# Patient Record
Sex: FEMALE | Race: OTHER | NOT HISPANIC OR LATINO | URBAN - METROPOLITAN AREA
[De-identification: names, ages, dates, MRNs, and addresses within clinical notes are randomized per-mention and may not be internally consistent; named-entity substitution may affect disease eponyms.]

---

## 2021-01-01 ENCOUNTER — INPATIENT (INPATIENT)
Facility: HOSPITAL | Age: 86
LOS: 6 days | End: 2022-01-04
Attending: INTERNAL MEDICINE | Admitting: INTERNAL MEDICINE
Payer: MEDICARE

## 2021-01-01 VITALS
HEART RATE: 116 BPM | SYSTOLIC BLOOD PRESSURE: 122 MMHG | RESPIRATION RATE: 22 BRPM | DIASTOLIC BLOOD PRESSURE: 81 MMHG | OXYGEN SATURATION: 88 % | TEMPERATURE: 98 F

## 2021-01-01 DIAGNOSIS — Z51.5 ENCOUNTER FOR PALLIATIVE CARE: ICD-10-CM

## 2021-01-01 DIAGNOSIS — Z29.9 ENCOUNTER FOR PROPHYLACTIC MEASURES, UNSPECIFIED: ICD-10-CM

## 2021-01-01 DIAGNOSIS — E87.5 HYPERKALEMIA: ICD-10-CM

## 2021-01-01 DIAGNOSIS — N17.9 ACUTE KIDNEY FAILURE, UNSPECIFIED: ICD-10-CM

## 2021-01-01 DIAGNOSIS — E87.0 HYPEROSMOLALITY AND HYPERNATREMIA: ICD-10-CM

## 2021-01-01 DIAGNOSIS — F03.90 UNSPECIFIED DEMENTIA WITHOUT BEHAVIORAL DISTURBANCE: ICD-10-CM

## 2021-01-01 DIAGNOSIS — R06.02 SHORTNESS OF BREATH: ICD-10-CM

## 2021-01-01 DIAGNOSIS — U07.1 COVID-19: ICD-10-CM

## 2021-01-01 DIAGNOSIS — I10 ESSENTIAL (PRIMARY) HYPERTENSION: ICD-10-CM

## 2021-01-01 DIAGNOSIS — R53.81 OTHER MALAISE: ICD-10-CM

## 2021-01-01 DIAGNOSIS — R63.0 ANOREXIA: ICD-10-CM

## 2021-01-01 DIAGNOSIS — R77.8 OTHER SPECIFIED ABNORMALITIES OF PLASMA PROTEINS: ICD-10-CM

## 2021-01-01 LAB
ALBUMIN SERPL ELPH-MCNC: 1.9 G/DL — LOW (ref 3.3–5)
ALBUMIN SERPL ELPH-MCNC: 2.1 G/DL — LOW (ref 3.3–5)
ALBUMIN SERPL ELPH-MCNC: 2.1 G/DL — LOW (ref 3.3–5)
ALBUMIN SERPL ELPH-MCNC: 2.3 G/DL — LOW (ref 3.3–5)
ALP SERPL-CCNC: 51 U/L — SIGNIFICANT CHANGE UP (ref 40–120)
ALP SERPL-CCNC: 51 U/L — SIGNIFICANT CHANGE UP (ref 40–120)
ALP SERPL-CCNC: 52 U/L — SIGNIFICANT CHANGE UP (ref 40–120)
ALP SERPL-CCNC: 56 U/L — SIGNIFICANT CHANGE UP (ref 40–120)
ALT FLD-CCNC: 17 U/L — SIGNIFICANT CHANGE UP (ref 12–78)
ALT FLD-CCNC: 18 U/L — SIGNIFICANT CHANGE UP (ref 12–78)
ALT FLD-CCNC: 18 U/L — SIGNIFICANT CHANGE UP (ref 12–78)
ALT FLD-CCNC: 20 U/L — SIGNIFICANT CHANGE UP (ref 12–78)
ANION GAP SERPL CALC-SCNC: 11 MMOL/L — SIGNIFICANT CHANGE UP (ref 5–17)
ANION GAP SERPL CALC-SCNC: 11 MMOL/L — SIGNIFICANT CHANGE UP (ref 5–17)
ANION GAP SERPL CALC-SCNC: 12 MMOL/L — SIGNIFICANT CHANGE UP (ref 5–17)
ANION GAP SERPL CALC-SCNC: 8 MMOL/L — SIGNIFICANT CHANGE UP (ref 5–17)
AST SERPL-CCNC: 45 U/L — HIGH (ref 15–37)
AST SERPL-CCNC: 50 U/L — HIGH (ref 15–37)
AST SERPL-CCNC: 54 U/L — HIGH (ref 15–37)
AST SERPL-CCNC: 73 U/L — HIGH (ref 15–37)
BASE EXCESS BLDV CALC-SCNC: -1.7 MMOL/L — SIGNIFICANT CHANGE UP (ref -2–3)
BASOPHILS # BLD AUTO: 0.03 K/UL — SIGNIFICANT CHANGE UP (ref 0–0.2)
BASOPHILS NFR BLD AUTO: 0.4 % — SIGNIFICANT CHANGE UP (ref 0–2)
BILIRUB DIRECT SERPL-MCNC: 0.3 MG/DL — SIGNIFICANT CHANGE UP (ref 0–0.3)
BILIRUB INDIRECT FLD-MCNC: 0.3 MG/DL — SIGNIFICANT CHANGE UP (ref 0.2–1)
BILIRUB SERPL-MCNC: 0.6 MG/DL — SIGNIFICANT CHANGE UP (ref 0.2–1.2)
BILIRUB SERPL-MCNC: 0.8 MG/DL — SIGNIFICANT CHANGE UP (ref 0.2–1.2)
BLOOD GAS COMMENTS, VENOUS: SIGNIFICANT CHANGE UP
BUN SERPL-MCNC: 100 MG/DL — HIGH (ref 7–23)
BUN SERPL-MCNC: 117 MG/DL — HIGH (ref 7–23)
BUN SERPL-MCNC: 120 MG/DL — HIGH (ref 7–23)
BUN SERPL-MCNC: 87 MG/DL — HIGH (ref 7–23)
CALCIUM SERPL-MCNC: 8.5 MG/DL — SIGNIFICANT CHANGE UP (ref 8.5–10.1)
CALCIUM SERPL-MCNC: 8.5 MG/DL — SIGNIFICANT CHANGE UP (ref 8.5–10.1)
CALCIUM SERPL-MCNC: 8.7 MG/DL — SIGNIFICANT CHANGE UP (ref 8.5–10.1)
CALCIUM SERPL-MCNC: 8.9 MG/DL — SIGNIFICANT CHANGE UP (ref 8.5–10.1)
CHLORIDE BLDV-SCNC: 111 MMOL/L — HIGH (ref 98–107)
CHLORIDE SERPL-SCNC: 108 MMOL/L — SIGNIFICANT CHANGE UP (ref 96–108)
CHLORIDE SERPL-SCNC: 113 MMOL/L — HIGH (ref 96–108)
CHLORIDE SERPL-SCNC: 114 MMOL/L — HIGH (ref 96–108)
CHLORIDE SERPL-SCNC: 114 MMOL/L — HIGH (ref 96–108)
CO2 BLDV-SCNC: 25 MMOL/L — SIGNIFICANT CHANGE UP (ref 22–26)
CO2 SERPL-SCNC: 23 MMOL/L — SIGNIFICANT CHANGE UP (ref 22–31)
CO2 SERPL-SCNC: 24 MMOL/L — SIGNIFICANT CHANGE UP (ref 22–31)
CO2 SERPL-SCNC: 25 MMOL/L — SIGNIFICANT CHANGE UP (ref 22–31)
CO2 SERPL-SCNC: 26 MMOL/L — SIGNIFICANT CHANGE UP (ref 22–31)
CREAT SERPL-MCNC: 4.15 MG/DL — HIGH (ref 0.5–1.3)
CREAT SERPL-MCNC: 4.27 MG/DL — HIGH (ref 0.5–1.3)
CREAT SERPL-MCNC: 4.56 MG/DL — HIGH (ref 0.5–1.3)
CREAT SERPL-MCNC: 4.57 MG/DL — HIGH (ref 0.5–1.3)
CREAT SERPL-MCNC: 4.66 MG/DL — HIGH (ref 0.5–1.3)
CREAT SERPL-MCNC: 4.81 MG/DL — HIGH (ref 0.5–1.3)
CREAT SERPL-MCNC: 5.02 MG/DL — HIGH (ref 0.5–1.3)
CRP SERPL-MCNC: 44 MG/L — HIGH
CRP SERPL-MCNC: 60 MG/L — HIGH
D DIMER BLD IA.RAPID-MCNC: 365 NG/ML DDU — HIGH
D DIMER BLD IA.RAPID-MCNC: 384 NG/ML DDU — HIGH
EOSINOPHIL # BLD AUTO: 0.02 K/UL — SIGNIFICANT CHANGE UP (ref 0–0.5)
EOSINOPHIL NFR BLD AUTO: 0.3 % — SIGNIFICANT CHANGE UP (ref 0–6)
FERRITIN SERPL-MCNC: 1286 NG/ML — HIGH (ref 15–150)
FERRITIN SERPL-MCNC: 1527 NG/ML — HIGH (ref 15–150)
FLUAV AG NPH QL: SIGNIFICANT CHANGE UP
FLUBV AG NPH QL: SIGNIFICANT CHANGE UP
GAS PNL BLDV: 144 MMOL/L — SIGNIFICANT CHANGE UP (ref 136–145)
GAS PNL BLDV: SIGNIFICANT CHANGE UP
GAS PNL BLDV: SIGNIFICANT CHANGE UP
GLUCOSE BLDV-MCNC: 122 MG/DL — HIGH (ref 65–95)
GLUCOSE SERPL-MCNC: 123 MG/DL — HIGH (ref 70–99)
GLUCOSE SERPL-MCNC: 129 MG/DL — HIGH (ref 70–99)
GLUCOSE SERPL-MCNC: 132 MG/DL — HIGH (ref 70–99)
GLUCOSE SERPL-MCNC: 174 MG/DL — HIGH (ref 70–99)
HCO3 BLDV-SCNC: 24 MMOL/L — SIGNIFICANT CHANGE UP (ref 22–28)
HCT VFR BLD CALC: 35.7 % — SIGNIFICANT CHANGE UP (ref 34.5–45)
HCT VFR BLD CALC: 41.5 % — SIGNIFICANT CHANGE UP (ref 34.5–45)
HCT VFR BLDA CALC: 35 % — LOW (ref 37–47)
HGB BLD CALC-MCNC: 11.8 G/DL — SIGNIFICANT CHANGE UP (ref 11.7–16.1)
HGB BLD-MCNC: 11.1 G/DL — LOW (ref 11.5–15.5)
HGB BLD-MCNC: 13 G/DL — SIGNIFICANT CHANGE UP (ref 11.5–15.5)
HOROWITZ INDEX BLDV+IHG-RTO: 100 — SIGNIFICANT CHANGE UP
IMM GRANULOCYTES NFR BLD AUTO: 5.9 % — HIGH (ref 0–1.5)
LACTATE BLDV-MCNC: 1.8 MMOL/L — HIGH (ref 0.56–1.39)
LACTATE SERPL-SCNC: 1.5 MMOL/L — SIGNIFICANT CHANGE UP (ref 0.7–2)
LDH SERPL L TO P-CCNC: 474 U/L — HIGH (ref 50–242)
LYMPHOCYTES # BLD AUTO: 1.81 K/UL — SIGNIFICANT CHANGE UP (ref 1–3.3)
LYMPHOCYTES # BLD AUTO: 23.4 % — SIGNIFICANT CHANGE UP (ref 13–44)
MCHC RBC-ENTMCNC: 30.9 PG — SIGNIFICANT CHANGE UP (ref 27–34)
MCHC RBC-ENTMCNC: 30.9 PG — SIGNIFICANT CHANGE UP (ref 27–34)
MCHC RBC-ENTMCNC: 31.1 GM/DL — LOW (ref 32–36)
MCHC RBC-ENTMCNC: 31.3 GM/DL — LOW (ref 32–36)
MCV RBC AUTO: 98.6 FL — SIGNIFICANT CHANGE UP (ref 80–100)
MCV RBC AUTO: 99.4 FL — SIGNIFICANT CHANGE UP (ref 80–100)
MONOCYTES # BLD AUTO: 0.46 K/UL — SIGNIFICANT CHANGE UP (ref 0–0.9)
MONOCYTES NFR BLD AUTO: 5.9 % — SIGNIFICANT CHANGE UP (ref 2–14)
NEUTROPHILS # BLD AUTO: 4.96 K/UL — SIGNIFICANT CHANGE UP (ref 1.8–7.4)
NEUTROPHILS NFR BLD AUTO: 64.1 % — SIGNIFICANT CHANGE UP (ref 43–77)
NRBC # BLD: 0 /100 WBCS — SIGNIFICANT CHANGE UP (ref 0–0)
NRBC # BLD: 0 /100 WBCS — SIGNIFICANT CHANGE UP (ref 0–0)
NT-PROBNP SERPL-SCNC: 2485 PG/ML — HIGH (ref 0–450)
PCO2 BLDV: 42 MMHG — SIGNIFICANT CHANGE UP (ref 42–55)
PH BLDV: 7.36 — SIGNIFICANT CHANGE UP (ref 7.32–7.43)
PLATELET # BLD AUTO: 128 K/UL — LOW (ref 150–400)
PLATELET # BLD AUTO: 91 K/UL — LOW (ref 150–400)
PO2 BLDV: 33 MMHG — SIGNIFICANT CHANGE UP (ref 25–45)
POTASSIUM BLDV-SCNC: 4.3 MMOL/L — SIGNIFICANT CHANGE UP (ref 3.5–5.1)
POTASSIUM SERPL-MCNC: 4.3 MMOL/L — SIGNIFICANT CHANGE UP (ref 3.5–5.3)
POTASSIUM SERPL-MCNC: 4.9 MMOL/L — SIGNIFICANT CHANGE UP (ref 3.5–5.3)
POTASSIUM SERPL-MCNC: 5.4 MMOL/L — HIGH (ref 3.5–5.3)
POTASSIUM SERPL-MCNC: 5.4 MMOL/L — HIGH (ref 3.5–5.3)
POTASSIUM SERPL-SCNC: 4.3 MMOL/L — SIGNIFICANT CHANGE UP (ref 3.5–5.3)
POTASSIUM SERPL-SCNC: 4.9 MMOL/L — SIGNIFICANT CHANGE UP (ref 3.5–5.3)
POTASSIUM SERPL-SCNC: 5.4 MMOL/L — HIGH (ref 3.5–5.3)
POTASSIUM SERPL-SCNC: 5.4 MMOL/L — HIGH (ref 3.5–5.3)
PROCALCITONIN SERPL-MCNC: 0.56 NG/ML — HIGH (ref 0.02–0.1)
PROT SERPL-MCNC: 6.1 GM/DL — SIGNIFICANT CHANGE UP (ref 6–8.3)
PROT SERPL-MCNC: 6.5 GM/DL — SIGNIFICANT CHANGE UP (ref 6–8.3)
PROT SERPL-MCNC: 6.9 GM/DL — SIGNIFICANT CHANGE UP (ref 6–8.3)
PROT SERPL-MCNC: 7.4 GM/DL — SIGNIFICANT CHANGE UP (ref 6–8.3)
RBC # BLD: 3.59 M/UL — LOW (ref 3.8–5.2)
RBC # BLD: 4.21 M/UL — SIGNIFICANT CHANGE UP (ref 3.8–5.2)
RBC # FLD: 13.1 % — SIGNIFICANT CHANGE UP (ref 10.3–14.5)
RBC # FLD: 13.5 % — SIGNIFICANT CHANGE UP (ref 10.3–14.5)
SAO2 % BLDV: 54.2 % — LOW (ref 94–98)
SARS-COV-2 RNA SPEC QL NAA+PROBE: DETECTED
SODIUM SERPL-SCNC: 145 MMOL/L — SIGNIFICANT CHANGE UP (ref 135–145)
SODIUM SERPL-SCNC: 147 MMOL/L — HIGH (ref 135–145)
SODIUM SERPL-SCNC: 148 MMOL/L — HIGH (ref 135–145)
SODIUM SERPL-SCNC: 149 MMOL/L — HIGH (ref 135–145)
TROPONIN I, HIGH SENSITIVITY RESULT: 101.2 NG/L — HIGH
TROPONIN I, HIGH SENSITIVITY RESULT: 102 NG/L — HIGH
TROPONIN I, HIGH SENSITIVITY RESULT: 42.8 NG/L — SIGNIFICANT CHANGE UP
TROPONIN I, HIGH SENSITIVITY RESULT: 56.7 NG/L — HIGH
TROPONIN I, HIGH SENSITIVITY RESULT: 65 NG/L — HIGH
WBC # BLD: 7.39 K/UL — SIGNIFICANT CHANGE UP (ref 3.8–10.5)
WBC # BLD: 7.74 K/UL — SIGNIFICANT CHANGE UP (ref 3.8–10.5)
WBC # FLD AUTO: 7.39 K/UL — SIGNIFICANT CHANGE UP (ref 3.8–10.5)
WBC # FLD AUTO: 7.74 K/UL — SIGNIFICANT CHANGE UP (ref 3.8–10.5)

## 2021-01-01 PROCEDURE — 93010 ELECTROCARDIOGRAM REPORT: CPT

## 2021-01-01 PROCEDURE — 99232 SBSQ HOSP IP/OBS MODERATE 35: CPT

## 2021-01-01 PROCEDURE — 93970 EXTREMITY STUDY: CPT | Mod: 26

## 2021-01-01 PROCEDURE — 99223 1ST HOSP IP/OBS HIGH 75: CPT

## 2021-01-01 PROCEDURE — 71045 X-RAY EXAM CHEST 1 VIEW: CPT | Mod: 26

## 2021-01-01 PROCEDURE — 99291 CRITICAL CARE FIRST HOUR: CPT | Mod: CS

## 2021-01-01 PROCEDURE — 99233 SBSQ HOSP IP/OBS HIGH 50: CPT

## 2021-01-01 PROCEDURE — 99222 1ST HOSP IP/OBS MODERATE 55: CPT

## 2021-01-01 RX ORDER — REMDESIVIR 5 MG/ML
100 INJECTION INTRAVENOUS EVERY 24 HOURS
Refills: 0 | Status: COMPLETED | OUTPATIENT
Start: 2021-01-01 | End: 2022-01-01

## 2021-01-01 RX ORDER — ALBUTEROL 90 UG/1
2 AEROSOL, METERED ORAL EVERY 6 HOURS
Refills: 0 | Status: DISCONTINUED | OUTPATIENT
Start: 2021-01-01 | End: 2022-01-01

## 2021-01-01 RX ORDER — SODIUM CHLORIDE 9 MG/ML
1000 INJECTION, SOLUTION INTRAVENOUS
Refills: 0 | Status: DISCONTINUED | OUTPATIENT
Start: 2021-01-01 | End: 2022-01-01

## 2021-01-01 RX ORDER — HYDROMORPHONE HYDROCHLORIDE 2 MG/ML
0.25 INJECTION INTRAMUSCULAR; INTRAVENOUS; SUBCUTANEOUS EVERY 6 HOURS
Refills: 0 | Status: DISCONTINUED | OUTPATIENT
Start: 2021-01-01 | End: 2022-01-01

## 2021-01-01 RX ORDER — REMDESIVIR 5 MG/ML
INJECTION INTRAVENOUS
Refills: 0 | Status: COMPLETED | OUTPATIENT
Start: 2021-01-01 | End: 2022-01-01

## 2021-01-01 RX ORDER — SODIUM POLYSTYRENE SULFONATE 4.1 MEQ/G
15 POWDER, FOR SUSPENSION ORAL ONCE
Refills: 0 | Status: COMPLETED | OUTPATIENT
Start: 2021-01-01 | End: 2021-01-01

## 2021-01-01 RX ORDER — DEXAMETHASONE 0.5 MG/5ML
6 ELIXIR ORAL DAILY
Refills: 0 | Status: DISCONTINUED | OUTPATIENT
Start: 2021-01-01 | End: 2022-01-01

## 2021-01-01 RX ORDER — LISINOPRIL 2.5 MG/1
20 TABLET ORAL DAILY
Refills: 0 | Status: DISCONTINUED | OUTPATIENT
Start: 2021-01-01 | End: 2021-01-01

## 2021-01-01 RX ORDER — ALBUTEROL 90 UG/1
2 AEROSOL, METERED ORAL ONCE
Refills: 0 | Status: COMPLETED | OUTPATIENT
Start: 2021-01-01 | End: 2021-01-01

## 2021-01-01 RX ORDER — REMDESIVIR 5 MG/ML
100 INJECTION INTRAVENOUS EVERY 24 HOURS
Refills: 0 | Status: COMPLETED | OUTPATIENT
Start: 2021-01-01 | End: 2021-01-01

## 2021-01-01 RX ORDER — HEPARIN SODIUM 5000 [USP'U]/ML
5000 INJECTION INTRAVENOUS; SUBCUTANEOUS EVERY 12 HOURS
Refills: 0 | Status: DISCONTINUED | OUTPATIENT
Start: 2021-01-01 | End: 2022-01-01

## 2021-01-01 RX ORDER — DEXAMETHASONE 0.5 MG/5ML
6 ELIXIR ORAL ONCE
Refills: 0 | Status: COMPLETED | OUTPATIENT
Start: 2021-01-01 | End: 2021-01-01

## 2021-01-01 RX ORDER — ACETAMINOPHEN 500 MG
650 TABLET ORAL EVERY 6 HOURS
Refills: 0 | Status: DISCONTINUED | OUTPATIENT
Start: 2021-01-01 | End: 2022-01-01

## 2021-01-01 RX ADMIN — HEPARIN SODIUM 5000 UNIT(S): 5000 INJECTION INTRAVENOUS; SUBCUTANEOUS at 17:40

## 2021-01-01 RX ADMIN — HYDROMORPHONE HYDROCHLORIDE 0.25 MILLIGRAM(S): 2 INJECTION INTRAMUSCULAR; INTRAVENOUS; SUBCUTANEOUS at 23:59

## 2021-01-01 RX ADMIN — SODIUM CHLORIDE 100 MILLILITER(S): 9 INJECTION, SOLUTION INTRAVENOUS at 17:17

## 2021-01-01 RX ADMIN — HEPARIN SODIUM 5000 UNIT(S): 5000 INJECTION INTRAVENOUS; SUBCUTANEOUS at 06:14

## 2021-01-01 RX ADMIN — Medication 6 MILLIGRAM(S): at 19:49

## 2021-01-01 RX ADMIN — Medication 6 MILLIGRAM(S): at 06:14

## 2021-01-01 RX ADMIN — SODIUM CHLORIDE 80 MILLILITER(S): 9 INJECTION, SOLUTION INTRAVENOUS at 14:41

## 2021-01-01 RX ADMIN — ALBUTEROL 2 PUFF(S): 90 AEROSOL, METERED ORAL at 18:23

## 2021-01-01 RX ADMIN — REMDESIVIR 500 MILLIGRAM(S): 5 INJECTION INTRAVENOUS at 17:20

## 2021-01-01 RX ADMIN — HEPARIN SODIUM 5000 UNIT(S): 5000 INJECTION INTRAVENOUS; SUBCUTANEOUS at 17:20

## 2021-01-01 RX ADMIN — Medication 6 MILLIGRAM(S): at 14:39

## 2021-01-01 RX ADMIN — SODIUM CHLORIDE 100 MILLILITER(S): 9 INJECTION, SOLUTION INTRAVENOUS at 12:17

## 2021-01-01 RX ADMIN — SODIUM CHLORIDE 80 MILLILITER(S): 9 INJECTION, SOLUTION INTRAVENOUS at 05:19

## 2021-01-01 RX ADMIN — HYDROMORPHONE HYDROCHLORIDE 0.25 MILLIGRAM(S): 2 INJECTION INTRAMUSCULAR; INTRAVENOUS; SUBCUTANEOUS at 17:18

## 2021-01-01 RX ADMIN — SODIUM POLYSTYRENE SULFONATE 15 GRAM(S): 4.1 POWDER, FOR SUSPENSION ORAL at 14:41

## 2021-01-01 RX ADMIN — HEPARIN SODIUM 5000 UNIT(S): 5000 INJECTION INTRAVENOUS; SUBCUTANEOUS at 17:18

## 2021-01-01 RX ADMIN — Medication 6 MILLIGRAM(S): at 05:18

## 2021-01-01 RX ADMIN — HEPARIN SODIUM 5000 UNIT(S): 5000 INJECTION INTRAVENOUS; SUBCUTANEOUS at 05:19

## 2021-01-01 RX ADMIN — REMDESIVIR 500 MILLIGRAM(S): 5 INJECTION INTRAVENOUS at 17:40

## 2021-01-01 RX ADMIN — REMDESIVIR 500 MILLIGRAM(S): 5 INJECTION INTRAVENOUS at 17:18

## 2021-01-01 RX ADMIN — LISINOPRIL 20 MILLIGRAM(S): 2.5 TABLET ORAL at 06:15

## 2021-12-28 NOTE — ED PROVIDER NOTE - CLINICAL SUMMARY MEDICAL DECISION MAKING FREE TEXT BOX
89 yo female with pmhx htn presenting with weakness and cough. concern for covid hypoxia, in resp distress, will obtain covid labs, cxr, will give steroids, will start on hfnc for resp support. d/w patient, currently FULL CODE.

## 2021-12-28 NOTE — ED PROVIDER NOTE - OBJECTIVE STATEMENT
89 yo female with pmhx htn presenting with weakness and cough. Patient was having weakness/lethargy, where [facility] called EMS. O2 sat as 50s%, 83% on NRB, brought to ED. NRB 15L O2 88-90%. Patient denies any other sx.    Denies CP, LOC, N/V/D, CP.

## 2021-12-28 NOTE — H&P ADULT - ASSESSMENT
Patient is a 89F with a PMH of HTN who presents to the ED for dyspnea.  Patient currently AAOx2, able to provide limited history.  Patient admitted  for COVID19.  Has had symptoms for 7 days.  Symptoms include fever, chills, cough, generalized weakness and increased dyspnea.  Cough has been nonproductive.  Patient states that she has not received a COVID vaccine.  Nonsmoker, NKDA.  Current SpO2 96% on 50L O2 via high flow NC.  Labs show elevated creatinine and elevated troponin.  Will admit to tele.

## 2021-12-28 NOTE — ED PROVIDER NOTE - CRITICAL CARE ATTENDING CONTRIBUTION TO CARE
Patient required frequent monitoring, interpreting results, starting HFNC, consulting nephro, requiring critical care time.

## 2021-12-28 NOTE — H&P ADULT - PROBLEM SELECTOR PLAN 2
Troponin elevated.  Will trend  No current chest pain.    Likely related to covid infection, resp failure

## 2021-12-28 NOTE — H&P ADULT - PROBLEM SELECTOR PLAN 1
COVID 19 swabbed in ED - +ve results.  Isolation precautions  Tylenol PRN fever  Will start dexamethasone as patient requiring supplemental oxygen  Holding remdesivier due to significant renal failure  hold lovenox as patient is thrombocytopenic  Prognosis poor - discuss GOC with family as patient currently AAOx2

## 2021-12-28 NOTE — H&P ADULT - NSHPLABSRESULTS_GEN_ALL_CORE
Recent Vitals  T(C): 36.6 (12-28-21 @ 17:39), Max: 36.6 (12-28-21 @ 17:39)  HR: 102 (12-28-21 @ 21:04) (102 - 116)  BP: 122/81 (12-28-21 @ 17:39) (122/81 - 122/81)  RR: 19 (12-28-21 @ 21:04) (19 - 22)  SpO2: 93% (12-28-21 @ 21:04) (88% - 93%)                        13.0   7.74  )-----------( 128      ( 28 Dec 2021 18:26 )             41.5     12-28    145  |  108  |  87<H>  ----------------------------<  123<H>  4.9   |  25  |  5.02<H>    Ca    8.5      28 Dec 2021 18:26    TPro  7.4  /  Alb  2.3<L>  /  TBili  0.8  /  DBili  x   /  AST  73<H>  /  ALT  20  /  AlkPhos  56  12-28      LIVER FUNCTIONS - ( 28 Dec 2021 18:26 )  Alb: 2.3 g/dL / Pro: 7.4 gm/dL / ALK PHOS: 56 U/L / ALT: 20 U/L / AST: 73 U/L / GGT: x               Home Medications:

## 2021-12-28 NOTE — ED PROVIDER NOTE - PROGRESS NOTE DETAILS
APPLE Peña MD  reassessed, improved sx with hfnc. KERI Ct 5, patient denies any kidney problems in past, d/w dr. esposito, aware. TBA. pt signed out to me from dr Peña, pt presented hypoxic, Covid +, to be admitted, currently on high flow, feeling more comfortable

## 2021-12-28 NOTE — H&P ADULT - NSHPPHYSICALEXAM_GEN_ALL_CORE
Physical exam:  General: patient in no acute distress, resting comfortably  Head:  Atraumatic, Normocephalic  Eyes: EOMI, PERRLA, clear sclera  Neck: Supple, thyroid nontender, non enlarged  Cardio: deferred   Resp: deferred   GI: abdomen soft, nontender, non distended, no guarding, BS +ve x 4  Ext: no significant pedal edema  Neuro: CN 2-12 intact, no significant motor or sensory deficits.  Skin: No rashes or lesions

## 2021-12-28 NOTE — ED ADULT TRIAGE NOTE - CHIEF COMPLAINT QUOTE
pt biba from home , a&O x3, was c.o of weakness , cough unable to ambulate pulse ox at home 50% 66% on room air. 83 non breather at 10L

## 2021-12-29 NOTE — CONSULT NOTE ADULT - SUBJECTIVE AND OBJECTIVE BOX
GERMÁN ALBERTO  MRN-[id number]        Patient is a 89y old  Female who presents with a chief complaint of covid (29 Dec 2021 13:53)      HPI:  Patient is a 89F with a PMH of HTN who presents to the ED for dyspnea.  Patient currently AAOx2, able to provide limited history.  Patient admitted  for COVID19.  Has had symptoms for 7 days.  Symptoms include fever, chills, cough, generalized weakness and increased dyspnea.  Cough has been nonproductive.  Patient states that she has not received a COVID vaccine.  Nonsmoker, NKDA.  Current SpO2 96% on 50L O2 via high flow NC.  Labs show elevated creatinine and elevated troponin.  Will admit to tele. (28 Dec 2021 21:44)      ID consulted for workup and antibiotic management     PAST MEDICAL & SURGICAL HISTORY:  HTN (hypertension)        Allergies  No Known Allergies        ANTIMICROBIALS:  remdesivir  IVPB    remdesivir  IVPB 100 every 24 hours      MEDICATIONS  (STANDING):    remdesivir  IVPB   500 mL/Hr IV Intermittent (12-29-21 @ 17:20)        OTHER MEDS: MEDICATIONS  (STANDING):  acetaminophen     Tablet .. 650 every 6 hours PRN  dexAMETHasone  Injectable 6 daily  heparin   Injectable 5000 every 12 hours  lisinopril 20 daily      SOCIAL HISTORY:     former smoker    FAMILY HISTORY:  FH: HTN (hypertension)        REVIEW OF SYSTEMS  [  ] ROS unobtainable because:    [ X ] All other systems negative except as noted below:	    Constitutional:  [ ] fever [ ] chills  [ ] weight loss  [X ] weakness  Skin:  [ ] rash [ ] phlebitis	  Eyes: [ ] icterus [ ] pain  [ ] discharge	  ENMT: [ ] sore throat  [ ] thrush [ ] ulcers [ ] exudates  Respiratory: [ X] dyspnea [ ] hemoptysis [X ] cough [ ] sputum	  Cardiovascular:  [ ] chest pain [ ] palpitations [ ] edema	  Gastrointestinal:  [ ] nausea [ ] vomiting [ ] diarrhea [ ] constipation [ ] pain	  Genitourinary:  [ ] dysuria [ ] frequency [ ] hematuria [ ] discharge [ ] flank pain  [ ] incontinence  Musculoskeletal:  [ ] myalgias [ ] arthralgias [ ] arthritis  [ ] back pain  Neurological:  [ ] headache [ ] seizures  [ ] confusion/altered mental status  Psychiatric:  [ ] anxiety [ ] depression	  Hematology/Lymphatics:  [ ] lymphadenopathy  Endocrine:  [ ] adrenal [ ] thyroid  Allergic/Immunologic:	 [ ] transplant [ ] seasonal    Vital Signs Last 24 Hrs  T(F): 97.6 (12-29-21 @ 23:46), Max: 97.9 (12-28-21 @ 17:39)    Vital Signs Last 24 Hrs  HR: 94 (12-30-21 @ 00:39) (87 - 103)  BP: 128/81 (12-29-21 @ 23:46) (104/60 - 130/90)  RR: 18 (12-30-21 @ 00:39)  SpO2: 95% (12-30-21 @ 00:39) (92% - 100%)  Wt(kg): --    PHYSICAL EXAM:  Constitutional: non-toxic, no distress, on HFNC  HEAD/EYES: anicteric, no conjunctival injection  ENT:  supple, no thrush  Cardiovascular:   normal S1, S2, no murmur, no edema  Respiratory:  crackles bilaterally, no wheezes,  GI:  soft, non-tender, normal bowel sounds  :  no garland, no CVA tenderness  Musculoskeletal:  no synovitis, normal ROM  Neurologic: awake and alert, no focal findings  Skin:  no rash, no erythema, no phlebitis  Heme/Onc: no lymphadenopathy   Psychiatric:  awake, alert, appropriate mood          WBC Count: 7.74 K/uL (12-28 @ 18:26)      Auto Neutrophil %: 64.1 % (12-28-21 @ 18:26)  Auto Neutrophil #: 4.96 K/uL (12-28-21 @ 18:26)                            13.0   7.74  )-----------( 128      ( 28 Dec 2021 18:26 )             41.5       12-29    x   |  x   |  x   ----------------------------<  x   x    |  x   |  4.57<H>    Ca    8.7      29 Dec 2021 08:21    TPro  6.5  /  Alb  2.1<L>  /  TBili  0.6  /  DBili  0.3  /  AST  54<H>  /  ALT  18  /  AlkPhos  51  12-29      Creatinine Trend: 4.57<--, 4.81<--, 5.02<--        MICROBIOLOGY:  Flu With COVID-19 By JEMAL (12.28.21 @ 18:26)    SARS-CoV-2 Result: Detected: EUA/IVD  This Respiratory Panel uses polymerase chain reaction (PCR) to detect for  influenza A; influenza B; and SARS-CoV-2.  This test was validated by [facility] and is in use under the FDA  Emergency Use Authorization (EUA) for clinical labs CLIA-certified to  perform high complexity testing. Test results should be correlated with  clinical presentation, patient history, and epidemiology.    Influenza A Result: NotDetec: EUA/IVD    Influenza B Result: NotDetec: EUA/IVD        C-Reactive Protein, Serum: 60 (12-29)    Ferritin, Serum: 1527 (12-29)      D-Dimer Assay, Quantitative: 365 (12-28)    Procalcitonin, Serum: 0.56 (12-29-21 @ 00:54)      RADIOLOGY:  < from: US Duplex Venous Lower Ext Complete, Bilateral (12.29.21 @ 17:58) >    IMPRESSION:  No evidence of deep venous thrombosis in either lower extremity.    < from: Xray Chest 1 View-PORTABLE IMMEDIATE (12.28.21 @ 19:05) >    IMPRESSION: Advanced bilateral infiltrates. Covid pneumonia not excluded.    (I personally reviewed)

## 2021-12-29 NOTE — PROGRESS NOTE ADULT - SUBJECTIVE AND OBJECTIVE BOX
Patient is a 89y old  Female who presents with a chief complaint of covid (29 Dec 2021 12:07)      OVERNIGHT EVENTS:    REVIEW OF SYSTEMS: denies chest pain/SOB, diaphoresis, no F/C, cough, dizziness, headache, blurry vision, nausea, vomiting, abdominal pain. All others review of systems negative     MEDICATIONS  (STANDING):  dexAMETHasone  Injectable 6 milliGRAM(s) IV Push daily  lisinopril 20 milliGRAM(s) Oral daily    MEDICATIONS  (PRN):  acetaminophen     Tablet .. 650 milliGRAM(s) Oral every 6 hours PRN Temp greater or equal to 38C (100.4F), Moderate Pain (4 - 6)      Allergies    No Known Allergies    Intolerances        T(F): 97.3 (12-29-21 @ 10:33), Max: 97.9 (12-28-21 @ 17:39)  HR: 102 (12-29-21 @ 10:33) (87 - 116)  BP: 104/60 (12-29-21 @ 10:33) (104/60 - 130/90)  RR: 20 (12-29-21 @ 13:44) (19 - 24)  SpO2: 94% (12-29-21 @ 13:44) (88% - 100%)  Wt(kg): --    PHYSICAL EXAM:  GENERAL: NAD  HEAD:  Atraumatic, Normocephalic  EYES: PERRLA, conjunctiva and sclera clear  ENMT: Moist mucous membranes  NECK: Supple, No JVD, Normal thyroid  NERVOUS SYSTEM:  Alert & Awake  CHEST/LUNG: Clear to percussion bilaterally;   HEART: Regular rate and rhythm;   ABDOMEN: Soft, Nontender, Nondistended; Bowel sounds present  EXTREMITIES:  no edema BL LE  SKIN: moist    LABS:                        13.0   7.74  )-----------( 128      ( 28 Dec 2021 18:26 )             41.5     12-28    145  |  108  |  87<H>  ----------------------------<  123<H>  4.9   |  25  |  5.02<H>    Ca    8.5      28 Dec 2021 18:26    TPro  7.4  /  Alb  2.3<L>  /  TBili  0.8  /  DBili  x   /  AST  73<H>  /  ALT  20  /  AlkPhos  56  12-28        Cultures;   CAPILLARY BLOOD GLUCOSE        Lipid panel:           RADIOLOGY & ADDITIONAL TESTS:    Imaging Personally Reviewed:  [x ] YES      Consultant(s) Notes Reviewed:  [x ] YES     Care Discussed with [x ] Consultants [X ] Patient [ ] Family  [x ]    [x ]  Other; RN

## 2021-12-29 NOTE — PATIENT PROFILE ADULT - FALL HARM RISK - HARM RISK INTERVENTIONS

## 2021-12-29 NOTE — CONSULT NOTE ADULT - SUBJECTIVE AND OBJECTIVE BOX
Patient chart reviewed, full consult to follow.     Thank you for the courtesy of this consultation.   [facility] NEPHROLOGY [facility], [facility]  NEPHROLOGY AND HYPERTENSION  [address]  [address]  [place] [address]  [phone]    MD JOANIE ESPINAL MD ANDREY GONCHARUK, MD MADHU KORRAPATI, MD YELENA ROSENBERG, MD BINNY KOSHY, MD CHRISTOPHER CAPUTO, MD EDWARD BOVER, MD      Information from chart:  "Patient is a 89y old  Female who presents with a chief complaint of covid (29 Dec 2021 13:53)    HPI:  Patient is a 89F with a PMH of HTN who presents to the ED for dyspnea.  Patient currently AAOx2, able to provide limited history.  Patient admitted  for COVID19.  Has had symptoms for 7 days.  Symptoms include fever, chills, cough, generalized weakness and increased dyspnea.  Cough has been nonproductive.  Patient states that she has not received a COVID vaccine.  Nonsmoker, NKDA.  Current SpO2 96% on 50L O2 via high flow NC.  Labs show elevated creatinine and elevated troponin.  Will admit to tele. (28 Dec 2021 21:44)   "      Patient appears comfrotable no distress    PAST MEDICAL & SURGICAL HISTORY:  HTN (hypertension)      FAMILY HISTORY:  FH: HTN (hypertension)      Allergies    No Known Allergies    Intolerances      Home Medications:    MEDICATIONS  (STANDING):  dexAMETHasone  Injectable 6 milliGRAM(s) IV Push daily  heparin   Injectable 5000 Unit(s) SubCutaneous every 12 hours  lisinopril 20 milliGRAM(s) Oral daily  remdesivir  IVPB   IV Intermittent     MEDICATIONS  (PRN):  acetaminophen     Tablet .. 650 milliGRAM(s) Oral every 6 hours PRN Temp greater or equal to 38C (100.4F), Moderate Pain (4 - 6)    Vital Signs Last 24 Hrs  T(C): 36.3 (29 Dec 2021 16:23), Max: 36.5 (29 Dec 2021 05:13)  T(F): 97.4 (29 Dec 2021 16:23), Max: 97.7 (29 Dec 2021 05:13)  HR: 92 (29 Dec 2021 20:53) (87 - 103)  BP: 113/71 (29 Dec 2021 16:23) (104/60 - 130/90)  BP(mean): --  RR: 20 (29 Dec 2021 20:53) (18 - 24)  SpO2: 96% (29 Dec 2021 20:53) (92% - 100%)    Daily Height in cm: 167.64 (29 Dec 2021 10:33)    Daily Weight in k.2 (29 Dec 2021 10:33)    21 @ 07:01  -  21 @ 23:42  --------------------------------------------------------  IN: 120 mL / OUT: 0 mL / NET: 120 mL      CAPILLARY BLOOD GLUCOSE        PHYSICAL EXAM:      T(C): 36.3 (21 @ 16:23), Max: 36.5 (21 @ 05:13)  HR: 92 (21 @ 20:53) (87 - 103)  BP: 113/71 (21 @ 16:23) (104/60 - 130/90)  RR: 20 (21 @ 20:53) (18 - 24)  SpO2: 96% (21 @ 20:53) (92% - 100%)  Wt(kg): --  Lungs clear  Heart S1S2  Abd soft NT ND  Extremities:   1 edema                  x   |  x   |  x   ----------------------------<  x   x    |  x   |  4.57<H>    Ca    8.7      29 Dec 2021 08:21    TPro  6.5  /  Alb  2.1<L>  /  TBili  0.6  /  DBili  0.3  /  AST  54<H>  /  ALT  18  /  AlkPhos  51                            13.0   7.74  )-----------( 128      ( 28 Dec 2021 18:26 )             41.5     Creatinine Trend: 4.57<--, 4.81<--, 5.02<--      Trend Bun/Cr  21 @ 17:47  BUN/CR -  -- / 4.57<H>  21 @ 08:21  BUN/CR -  100<H> / 4.81<H>  21 @ 18:26  BUN/CR -  87<H> / 5.02<H>      Assessment   KERI suspected degree of CKD  COVID related PNA    Plan  COVID protocol  Empiric diuresis;   Would defer the prospect of hemodialysis     Harman Escobar MD      Patient chart reviewed, full consult to follow.     Thank you for the courtesy of this consultation.

## 2021-12-29 NOTE — PATIENT PROFILE ADULT - IS PATIENT POST-MENOPAUSAL?
· X-ray right femur:  Acute displaced right femoral intertrochanteric fracture  · Consult orthopedics appreciated  · Cardiology consulted preoperatively, appreciate recommendations  · With acute blood loss anemia postoperatively   · Status post intramedullary fixation of right intertrochanteric hip fracture 8/27/20 by Dr Rekha Aguirre   · PT/OT/PMR, weight-bearing as tolerated right lower extremity  · Will be here over the weekend as patient is an insurance authorization  · Patient is currently on heparin for DVT prophylaxis due to renal function   · Geriatric pain protocol  · Patient will need to follow-up with Dr Rekha Aguirre in 2-3 weeks postoperatively  · Encouraged IS use which was performed at bedside with me today and she demonstrated accurate use    · Discharge to [facility] hopefully Monday yes

## 2021-12-30 NOTE — CONSULT NOTE ADULT - SUBJECTIVE AND OBJECTIVE BOX
HPI:  Patient is a 89F with a PMH of HTN who presents to the ED for dyspnea.  Patient currently AAOx2, able to provide limited history.  Patient admitted  for COVID19.  Has had symptoms for 7 days.  Symptoms include fever, chills, cough, generalized weakness and increased dyspnea.  Cough has been nonproductive.  Patient states that she has not received a COVID vaccine.  Nonsmoker, NKDA.  Current SpO2 96% on 50L O2 via high flow NC.  Labs show elevated creatinine and elevated troponin.  Will admit to tele. (28 Dec 2021 21:44)    PERTINENT PM/SXH:   HTN (hypertension)        FAMILY HISTORY:  FH: HTN (hypertension)      ITEMS NOT CHECKED ARE NOT PRESENT    SOCIAL HISTORY:   Significant other/partner[ ]  Children[ ]  Zoroastrian/Spirituality:  Substance hx:  [ ]   Tobacco hx:  [ ]   Alcohol hx: [ ]   Home Opioid hx:  [ ] I-Stop Reference No:  Living Situation: [ ]Home  [ ]Long term care  [ ]Rehab [ ]Other    ADVANCE DIRECTIVES:    DNR  MOLST  [ ]  Living Will  [ ]   DECISION MAKER(s):  [ ] Health Care Proxy(s)  [ ] Surrogate(s)  [ ] Guardian           Name(s): Phone Number(s):    BASELINE (I)ADL(s) (prior to admission):  [place]: [ ]Total  [ ] Moderate [ ]Dependent    Allergies    No Known Allergies    Intolerances    MEDICATIONS  (STANDING):  dexAMETHasone  Injectable 6 milliGRAM(s) IV Push daily  dextrose 5%. 1000 milliLiter(s) (80 mL/Hr) IV Continuous <Continuous>  heparin   Injectable 5000 Unit(s) SubCutaneous every 12 hours  lisinopril 20 milliGRAM(s) Oral daily  remdesivir  IVPB   IV Intermittent   remdesivir  IVPB 100 milliGRAM(s) IV Intermittent every 24 hours    MEDICATIONS  (PRN):  acetaminophen     Tablet .. 650 milliGRAM(s) Oral every 6 hours PRN Temp greater or equal to 38C (100.4F), Moderate Pain (4 - 6)    PRESENT SYMPTOMS: [ ]Unable to obtain due to poor mentation   Source if other than patient:  [ ]Family   [ ]Team     Pain: [ ]yes [ ]no  QOL impact -   Location -                    Aggravating factors -  Quality -  Radiation -  Timing-  Severity (0-10 scale):  Minimal acceptable level (0-10 scale):     CPOT:    https://www.sccm.org/getattachment/cxh84n95-3x8n-2b0x-2p0p-3414n1600a2a/Critical-Care-Pain-Observation-Tool-(CPOT)      PAIN AD Score:     http://geriatrictoolkit.[facility]/cog/painad.pdf (press ctrl +  left click to view)    Dyspnea:                           [ ]Mild [ ]Moderate [ ]Severe  Anxiety:                             [ ]Mild [ ]Moderate [ ]Severe  Fatigue:                             [ ]Mild [ ]Moderate [ ]Severe  Nausea:                             [ ]Mild [ ]Moderate [ ]Severe  Loss of appetite:              [ ]Mild [ ]Moderate [ ]Severe  Constipation:                    [ ]Mild [ ]Moderate [ ]Severe    Other Symptoms:  [ ]All other review of systems negative     Palliative Performance Status Version 2:         %    http://npcrc.org/files/news/palliative_performance_scale_ppsv2.pdf  PHYSICAL EXAM:  Vital Signs Last 24 Hrs  T(C): 36.3 (30 Dec 2021 11:44), Max: 36.4 (29 Dec 2021 23:46)  T(F): 97.3 (30 Dec 2021 11:44), Max: 97.6 (29 Dec 2021 23:46)  HR: 103 (30 Dec 2021 12:27) (92 - 103)  BP: 126/75 (30 Dec 2021 11:44) (113/71 - 128/81)  BP(mean): --  RR: 18 (30 Dec 2021 12:27) (18 - 20)  SpO2: 93% (30 Dec 2021 12:27) (93% - 98%) I&O's Summary    29 Dec 2021 07:01  -  30 Dec 2021 07:00  --------------------------------------------------------  IN: 120 mL / OUT: 0 mL / NET: 120 mL      GENERAL:  [ ]Alert  [ ]Oriented x   [ ]Lethargic  [ ]Cachexia  [ ]Unarousable  [ ]Verbal  [ ]Non-Verbal  Behavioral:   [ ] Anxiety  [ ] Delirium [ ] Agitation [ ] Other  HEENT:  [ ]Normal   [ ]Dry mouth   [ ]ET Tube/Trach  [ ]Oral lesions  PULMONARY:   [ ]Clear [ ]Tachypnea  [ ]Audible excessive secretions   [ ]Rhonchi        [ ]Right [ ]Left [ ]Bilateral  [ ]Crackles        [ ]Right [ ]Left [ ]Bilateral  [ ]Wheezing     [ ]Right [ ]Left [ ]Bilateral  [ ]Diminished breath sounds [ ]right [ ]left [ ]bilateral  CARDIOVASCULAR:    [ ]Regular [ ]Irregular [ ]Tachy  [ ]Darrian [ ]Murmur [ ]Other  GASTROINTESTINAL:  [ ]Soft  [ ]Distended   [ ]+BS  [ ]Non tender [ ]Tender  [ ]PEG [ ]OGT/ NGT  Last BM:   GENITOURINARY:  [ ]Normal [ ] Incontinent   [ ]Oliguria/Anuria   [ ]Huang  MUSCULOSKELETAL:   [ ]Normal   [ ]Weakness  [ ]Bed/Wheelchair bound [ ]Edema  NEUROLOGIC:   [ ]No focal deficits  [ ]Cognitive impairment  [ ]Dysphagia [ ]Dysarthria [ ]Paresis [ ]Other   SKIN:   [ ]Normal    [ ]Rash  [ ]Pressure ulcer(s)       Present on admission [ ]y [ ]n    CRITICAL CARE:  [ ] Shock Present  [ ]Septic [ ]Cardiogenic [ ]Neurologic [ ]Hypovolemic  [ ]  Vasopressors [ ]  Inotropes   [ ]Respiratory failure present [ ]Mechanical ventilation [ ]Non-invasive ventilatory support [ ]High flow    [ ]Acute  [ ]Chronic [ ]Hypoxic  [ ]Hypercarbic [ ]Other  [ ]Other organ failure     LABS:                        13.0   7.74  )-----------( 128      ( 28 Dec 2021 18:26 )             41.5   12-30    148<H>  |  114<H>  |  117<H>  ----------------------------<  129<H>  5.4<H>   |  26  |  4.56<H>    Ca    8.9      30 Dec 2021 07:01    TPro  6.9  /  Alb  2.1<L>  /  TBili  0.6  /  DBili  0.3  /  AST  50<H>  /  ALT  18  /  AlkPhos  52  12-30        RADIOLOGY & ADDITIONAL STUDIES:    PROTEIN CALORIE MALNUTRITION PRESENT: [ ]mild [ ]moderate [ ]severe [ ]underweight [ ]morbid obesity  https://www.andeal.org/vault/4637/web/files/ONC/Table_Clinical%20Characteristics%20to%20Document%20Malnutrition-White%20JV%20et%20al%812485.pdf    Height (cm): 167.6 (12-29-21 @ 10:33)  Weight (kg): 74.2 (12-29-21 @ 10:33)  BMI (kg/m2): 26.4 (12-29-21 @ 10:33)    [ ]PPSV2 < or = to 30% [ ]significant weight loss  [ ]poor nutritional intake  [ ]anasarca      [ ]Artificial Nutrition      REFERRALS:   [ ]Chaplaincy  [ ]Hospice  [ ]Child Life  [ ]Social Work  [ ]Case management [ ]Holistic Therapy     Goals of Care Document:  HPI:  Patient is a 89F with a PMH of HTN who presents to the ED for dyspnea.  Patient currently AAOx2, able to provide limited history.  Patient admitted  for COVID19.  Has had symptoms for 7 days.  Symptoms include fever, chills, cough, generalized weakness and increased dyspnea.  Cough has been nonproductive.  Patient states that she has not received a COVID vaccine.  Nonsmoker, NKDA.  Current SpO2 96% on 50L O2 via high flow NC.  Labs show elevated creatinine and elevated troponin.  Will admit to tele. (28 Dec 2021 21:44)    PERTINENT PM/SXH:   HTN (hypertension)        FAMILY HISTORY:  FH: HTN (hypertension)      ITEMS NOT CHECKED ARE NOT PRESENT  Pt unable to provide additional history   SOCIAL HISTORY:  unknown emergency contact- per pt family is all , pt lives in senior housing in [place]  Significant other/partner[ ]  Children[ ]  Pentecostal/Spirituality: Holiness  Substance hx:  [ ]   Tobacco hx:  [ ]   Alcohol hx: [ ]   Home Opioid hx:  [ ] I-Stop Reference No: [id number]  Living Situation: [x ]Home  [ ]Long term care  [ ]Rehab [ ]Other    ADVANCE DIRECTIVES:    DNR  MOLST  [ ]  Living Will  [ ]   DECISION MAKER(s):  [ ] Health Care Proxy(s)  [ ] Surrogate(s)  [ ] Guardian           Name(s): Phone Number(s): unknown     BASELINE (I)ADL(s) (prior to admission): per discussion with JALEESA, pt has HHA's, pt cannot provide any history   [place]: [ ]Total  [ ] Moderate [x ]Dependent    Allergies    No Known Allergies    Intolerances    MEDICATIONS  (STANDING):  dexAMETHasone  Injectable 6 milliGRAM(s) IV Push daily  dextrose 5%. 1000 milliLiter(s) (80 mL/Hr) IV Continuous <Continuous>  heparin   Injectable 5000 Unit(s) SubCutaneous every 12 hours  lisinopril 20 milliGRAM(s) Oral daily  remdesivir  IVPB   IV Intermittent   remdesivir  IVPB 100 milliGRAM(s) IV Intermittent every 24 hours    MEDICATIONS  (PRN):  acetaminophen     Tablet .. 650 milliGRAM(s) Oral every 6 hours PRN Temp greater or equal to 38C (100.4F), Moderate Pain (4 - 6)    PRESENT SYMPTOMS: [x ]Unable to obtain due to poor mentation   Source if other than patient:  [ ]Family   [ ]Team     Pain: [ ]yes [ ]no  QOL impact -   Location -                    Aggravating factors -  Quality -  Radiation -  Timing-  Severity (0-10 scale):  Minimal acceptable level (0-10 scale):     CPOT:    https://www.[facility].org/getattachment/vof12q14-6s4b-8z9p-2m1q-9287x6852h8n/Critical-Care-Pain-Observation-Tool-(CPOT)      PAIN AD Score:     http://geriatrictoolkit.[email or web address]/cog/painad.pdf (press ctrl +  left click to view)    Dyspnea:                           [ ]Mild [ ]Moderate [ ]Severe  Anxiety:                             [ ]Mild [ ]Moderate [ ]Severe  Fatigue:                             [ ]Mild [ ]Moderate [ ]Severe  Nausea:                             [ ]Mild [ ]Moderate [ ]Severe  Loss of appetite:              [ ]Mild [ ]Moderate [ ]Severe  Constipation:                    [ ]Mild [ ]Moderate [ ]Severe    Other Symptoms:  [ ]All other review of systems negative     Palliative Performance Status Version 2:      20   %    http://npcrc.org/files/news/palliative_performance_scale_ppsv2.pdf  PHYSICAL EXAM:  Vital Signs Last 24 Hrs  T(C): 36.3 (30 Dec 2021 11:44), Max: 36.4 (29 Dec 2021 23:46)  T(F): 97.3 (30 Dec 2021 11:44), Max: 97.6 (29 Dec 2021 23:46)  HR: 103 (30 Dec 2021 12:27) (92 - 103)  BP: 126/75 (30 Dec 2021 11:44) (113/71 - 128/81)  BP(mean): --  RR: 18 (30 Dec 2021 12:27) (18 - 20)  SpO2: 93% (30 Dec 2021 12:27) (93% - 98%) I&O's Summary    29 Dec 2021 07:01  -  30 Dec 2021 07:00  --------------------------------------------------------  IN: 120 mL / OUT: 0 mL / NET: 120 mL      GENERAL:  [x ]Alert  [ x]Oriented x 2   [ ]Lethargic  [ ]Cachexia  [ ]Unarousable  [x ]Verbal  [ ]Non-Verbal  Behavioral:   [ ] Anxiety  [ ] Delirium [ ] Agitation [ ] Other  HEENT:  [ ]Normal   [x ]Dry mouth   [ ]ET Tube/Trach  [ ]Oral lesions  PULMONARY:   [ ]Clear [ x]Tachypnea  [x ]Audible excessive secretions   [ ]Rhonchi        [ ]Right [ ]Left [ ]Bilateral  [ ]Crackles        [ ]Right [ ]Left [ ]Bilateral  [ ]Wheezing     [ ]Right [ ]Left [ ]Bilateral  [ ]Diminished breath sounds [ ]right [ ]left [ ]bilateral  CARDIOVASCULAR:    [ ]Regular [ ]Irregular [ x]Tachy  [ ]Darrian [ ]Murmur [ ]Other  GASTROINTESTINAL:  [ x]Soft  [ ]Distended   [ x]+BS  [ x]Non tender [ ]Tender  [ ]PEG [ ]OGT/ NGT  Last BM: pta  GENITOURINARY:  [ ]Normal [ x] Incontinent   [ ]Oliguria/Anuria   [ ]Huang  MUSCULOSKELETAL:   [ ]Normal   [ x]Weakness  [ ]Bed/Wheelchair bound [x ]Edema  NEUROLOGIC:   [ ]No focal deficits  [ x]Cognitive impairment  [ x]Dysphagia [ ]Dysarthria [ ]Paresis [ ]Other   SKIN:   [ x]Normal    [ ]Rash  [ ]Pressure ulcer(s)       Present on admission [ ]y [ ]n    CRITICAL CARE:  [ ] Shock Present  [ ]Septic [ ]Cardiogenic [ ]Neurologic [ ]Hypovolemic  [ ]  Vasopressors [ ]  Inotropes   [x ]Respiratory failure present [ ]Mechanical ventilation [ ]Non-invasive ventilatory support [ x]High flow    [ x]Acute  [ ]Chronic [x ]Hypoxic  [ ]Hypercarbic [ ]Other  [ x]Other organ failure     LABS:                        13.0   7.74  )-----------( 128      ( 28 Dec 2021 18:26 )             41.5   12-30    148<H>  |  114<H>  |  117<H>  ----------------------------<  129<H>  5.4<H>   |  26  |  4.56<H>    Ca    8.9      30 Dec 2021 07:01    TPro  6.9  /  Alb  2.1<L>  /  TBili  0.6  /  DBili  0.3  /  AST  50<H>  /  ALT  18  /  AlkPhos  52  12-30        RADIOLOGY & ADDITIONAL STUDIES:  < from: US Duplex Venous Lower Ext Complete, Bilateral (21 @ 17:58) >    ACC: [id number] EXAM:  US DPLX LWR EXT VEINS COMPL BI                          PROCEDURE DATE:  2021          INTERPRETATION:  CLINICAL INFORMATION: COVID positive with hypoxia.    COMPARISON: None available.    TECHNIQUE: Duplex sonography of the BILATERAL LOWER extremity veins with   color and spectral Doppler, with and without compression.    FINDINGS:    RIGHT:  Normal compressibility of the RIGHT common femoral, femoral and popliteal   veins.  Doppler examination shows normal spontaneous and phasic flow.  No RIGHT calf vein thrombosis is detected.    LEFT:  Normal compressibility of the LEFT common femoral, femoral and popliteal   veins.  Doppler examination shows normal spontaneous and phasic flow.  No LEFT calf vein thrombosis isdetected.    IMPRESSION:  No evidence of deep venous thrombosis in either lower extremity.    --- End of Report ---      FAB DAILEY MD; Attending Radiologist  This document has been electronically signed. Dec 29 2021  6:08PM    < end of copied text >  < from: Xray Chest 1 View-PORTABLE IMMEDIATE (21 @ 19:05) >    ACC: [id number] EXAM:  XR CHEST PORTABLE IMMED 1V                          PROCEDURE DATE:  2021          INTERPRETATION:  AP chest on 2021 at 6:06 PM. Patient is   short of breath with cough and fever.    COMPARISON: None available.    Shallow inspiration crowds the chest.    Heart magnified by technique.    There is advanced bilateral lung infiltrates and calcified granulomas   over the right chest.    IMPRESSION: Advanced bilateral infiltrates. Covid pneumonia not excluded.    --- End of Report ---        CARA ZEPEDA MD; Attending Radiologist  This document has been electronically signed. Dec 29 2021  9:10AM    < end of copied text >    PROTEIN CALORIE MALNUTRITION PRESENT: [ ]mild [ x]moderate [ ]severe [ ]underweight [ ]morbid obesity  https://www.andeal.org/vault/2440/web/files/ONC/Table_Clinical%20Characteristics%20to%20Document%20Malnutrition-White%20JV%20et%20al%2020.pdf    Height (cm): 167.6 (21 @ 10:33)  Weight (kg): 74.2 (21 @ 10:33)  BMI (kg/m2): 26.4 (21 @ 10:33)    [x ]PPSV2 < or = to 30% [ ]significant weight loss  [ x]poor nutritional intake  [ x]anasarca      [ ]Artificial Nutrition      REFERRALS:   [ ]Chaplaincy  [ ]Hospice  [ ]Child Life  [ ]Social Work  [ ]Case management [ ]Holistic Therapy     Goals of Care Document:

## 2021-12-30 NOTE — CONSULT NOTE ADULT - PROBLEM SELECTOR RECOMMENDATION 7
FAST 6E  dependent for care, no family or surrogates known or at least that patient can offer  pt lacks capacity to make her own medical decisions

## 2021-12-30 NOTE — PROGRESS NOTE ADULT - SUBJECTIVE AND OBJECTIVE BOX
Resting    Vital Signs Last 24 Hrs  T(C): 36.4 (12-30-21 @ 16:09), Max: 36.4 (12-29-21 @ 23:46)  T(F): 97.5 (12-30-21 @ 16:09), Max: 97.6 (12-29-21 @ 23:46)  HR: 97 (12-30-21 @ 21:11) (94 - 103)  BP: 143/73 (12-30-21 @ 16:09) (125/66 - 143/73)  RR: 18 (12-30-21 @ 21:11) (18 - 20)  SpO2: 95% (12-30-21 @ 21:11) (93% - 98%)    Lungs b/l air entry  Heart S1S2  Abd soft NT ND  Extremities: edema    30 Dec 2021 07:01    148    |  114    |  117    ----------------------------<  129    5.4     |  26     |  4.56     Ca    8.9        30 Dec 2021 07:01    TPro  6.9    /  Alb  2.1    /  TBili  0.6    /  DBili  0.3    /  AST  50     /  ALT  18     /  AlkPhos  52     30 Dec 2021 07:01    LIVER FUNCTIONS - ( 30 Dec 2021 07:01 )  Alb: 2.1 g/dL / Pro: 6.9 gm/dL / ALK PHOS: 52 U/L / ALT: 18 U/L / AST: 50 U/L / GGT: x           Culture - Blood (collected 29 Dec 2021 00:36)  Source: .Blood Blood-Peripheral  Preliminary Report (30 Dec 2021 01:02):    No growth to date.    Culture - Blood (collected 29 Dec 2021 00:36)  Source: .Blood Blood-Peripheral  Preliminary Report (30 Dec 2021 01:02):    No growth to date.    acetaminophen     Tablet .. 650 milliGRAM(s) Oral every 6 hours PRN  dexAMETHasone  Injectable 6 milliGRAM(s) IV Push daily  dextrose 5%. 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

## 2021-12-30 NOTE — CONSULT NOTE ADULT - PROBLEM SELECTOR RECOMMENDATION 5
on treatment with remdesivir and decadron  CXR w bilateral infiltrates    high oxygen demand   prognosis is poor due to advanced age and end organ damage   outcome not likely to change with aggressive treatment

## 2021-12-30 NOTE — PROGRESS NOTE ADULT - SUBJECTIVE AND OBJECTIVE BOX
Patient is a 89y old  Female who presents with a chief complaint of covid (29 Dec 2021 23:45)      OVERNIGHT EVENTS:    REVIEW OF SYSTEMS: denies chest pain/SOB, diaphoresis, no F/C, cough, dizziness, headache, blurry vision, nausea, vomiting, abdominal pain. All others review of systems negative     MEDICATIONS  (STANDING):  dexAMETHasone  Injectable 6 milliGRAM(s) IV Push daily  heparin   Injectable 5000 Unit(s) SubCutaneous every 12 hours  lisinopril 20 milliGRAM(s) Oral daily  remdesivir  IVPB   IV Intermittent   remdesivir  IVPB 100 milliGRAM(s) IV Intermittent every 24 hours    MEDICATIONS  (PRN):  acetaminophen     Tablet .. 650 milliGRAM(s) Oral every 6 hours PRN Temp greater or equal to 38C (100.4F), Moderate Pain (4 - 6)      Allergies    No Known Allergies    Intolerances        T(F): 97.3 (12-30-21 @ 11:44), Max: 97.6 (12-29-21 @ 23:46)  HR: 103 (12-30-21 @ 12:27) (92 - 103)  BP: 126/75 (12-30-21 @ 11:44) (113/71 - 128/81)  RR: 18 (12-30-21 @ 12:27) (18 - 20)  SpO2: 93% (12-30-21 @ 12:27) (93% - 98%)  Wt(kg): --    PHYSICAL EXAM:  GENERAL: NAD  HEAD:  Atraumatic, Normocephalic  EYES: PERRLA, conjunctiva and sclera clear  ENMT: Moist mucous membranes  NECK: Supple, No JVD, Normal thyroid  NERVOUS SYSTEM:  Alert & Awake  CHEST/LUNG: Clear to percussion bilaterally;   HEART: Regular rate and rhythm;   ABDOMEN: Soft, Nontender, Nondistended; Bowel sounds present  EXTREMITIES:  no edema BL LE  SKIN: moist    LABS:                        13.0   7.74  )-----------( 128      ( 28 Dec 2021 18:26 )             41.5     12-30    148<H>  |  114<H>  |  117<H>  ----------------------------<  129<H>  5.4<H>   |  26  |  4.56<H>    Ca    8.9      30 Dec 2021 07:01    TPro  6.9  /  Alb  2.1<L>  /  TBili  0.6  /  DBili  0.3  /  AST  50<H>  /  ALT  18  /  AlkPhos  52  12-30        Cultures;   CAPILLARY BLOOD GLUCOSE        Lipid panel:           RADIOLOGY & ADDITIONAL TESTS:    Imaging Personally Reviewed:  [x ] YES      Consultant(s) Notes Reviewed:  [x ] YES     Care Discussed with [x ] Consultants [X ] Patient [ ] Family  [x ]    [x ]  Other; RN

## 2021-12-30 NOTE — CONSULT NOTE ADULT - CONSULT REASON
GOC, advanced age, COVID
89 year old female with COVID-19 and limited capacity who has not identified an emergency contact for surrogate decision making.
KERI
COVID

## 2021-12-30 NOTE — PROGRESS NOTE ADULT - SUBJECTIVE AND OBJECTIVE BOX
GERMÁN ALBERTO  MRN-[id number]      Follow Up:  COVID    Interval History: patient seen and examined, on HFNC, no complaints other then a mild cough, patient reports she has no family     ROS:    [ ] Unobtainable because:  [X] All other systems negative except as noted    Constitutional: no fever, no chills  Head: no trauma  Eyes: no vision changes, no eye pain  ENT:  no sore throat, no rhinorrhea  Cardiovascular:  no chest pain, no palpitation  Respiratory:  no SOB, no cough  GI:  no abd pain, no vomiting, no diarrhea  urinary: no dysuria, no hematuria, no flank pain  musculoskeletal:  no joint pain, no joint swelling  skin:  no rash  neurology:  no headache, no seizure, no change in mental status  psych: no anxiety, no depression         Allergies  No Known Allergies        ANTIMICROBIALS:  remdesivir  IVPB    remdesivir  IVPB 100 every 24 hours      OTHER MEDS:  acetaminophen     Tablet .. 650 milliGRAM(s) Oral every 6 hours PRN  dexAMETHasone  Injectable 6 milliGRAM(s) IV Push daily  dextrose 5%. 1000 milliLiter(s) IV Continuous <Continuous>  heparin   Injectable 5000 Unit(s) SubCutaneous every 12 hours      Physical Exam:  Vital Signs Last 24 Hrs  T(C): 36.4 (30 Dec 2021 16:09), Max: 36.4 (29 Dec 2021 23:46)  T(F): 97.5 (30 Dec 2021 16:09), Max: 97.6 (29 Dec 2021 23:46)  HR: 97 (30 Dec 2021 21:11) (94 - 103)  BP: 143/73 (30 Dec 2021 16:09) (125/66 - 143/73)  BP(mean): --  RR: 18 (30 Dec 2021 21:11) (18 - 20)  SpO2: 95% (30 Dec 2021 21:11) (93% - 98%)    General:    NAD,  non toxic, on HFNC  Head: atraumatic, normocephalic  Eye: normal sclera and conjunctiva  ENT:    no oral lesions, neck supple, poor dentition   Cardio:     regular S1, S2,  no murmur  Respiratory:    +rales  b/l,    no wheezing  abd:     soft,   BS +,   no tenderness  :   no CVAT,  no suprapubic tenderness,   no  garland  Musculoskeletal:   no joint swelling,   no edema, +hand deformity with contracted hands   vascular: no central lines, +PIV   Skin:    no rash  Neurologic:     no focal deficit  psych: normal affect    WBC Count: 7.74 K/uL (12-28 @ 18:26)          12-30    148<H>  |  114<H>  |  117<H>  ----------------------------<  129<H>  5.4<H>   |  26  |  4.56<H>    Ca    8.9      30 Dec 2021 07:01    TPro  6.9  /  Alb  2.1<L>  /  TBili  0.6  /  DBili  0.3  /  AST  50<H>  /  ALT  18  /  AlkPhos  52  12-30          Creatinine Trend: 4.56<--, 4.57<--, 4.81<--, 5.02<--      MICROBIOLOGY:  v  .Blood Blood-Peripheral  12-29-21   No growth to date.  --  --      C-Reactive Protein, Serum: 44 (12-30)  C-Reactive Protein, Serum: 60 (12-29)    Ferritin, Serum: 1286 (12-30)  Ferritin, Serum: 1527 (12-29)      D-Dimer Assay, Quantitative: 384 (12-30)  D-Dimer Assay, Quantitative: 365 (12-28)    Procalcitonin, Serum: 0.56 (12-29-21 @ 00:54)        RADIOLOGY:

## 2021-12-30 NOTE — CONSULT NOTE ADULT - PROBLEM SELECTOR RECOMMENDATION 6
dependent for care  pt able to tell me where she lives but identifies no family or emergency contacts, repeats that she lives in [place] in an apt and has "roommates"

## 2021-12-30 NOTE — CONSULT NOTE ADULT - PROBLEM SELECTOR RECOMMENDATION 9
pt is advanced age with dementia, debility and COVID PNA in hypoxic respiratory failure and acute renal failure with no surrogate decision makers known. Pt would not benefit from advanced cardiac life support or mechanical ventilation as it would not change outcome and only produce suffering. Physicians are under no legal or ethical obligation to offer treatments that would 1) provide no medically indicated benefit or 2) impose unnecessary risk or burden to the patient. The benefits of each intervention offered including life sustaining measures must be weighed against the possible harm of each procedure or intervention when it no longer improves a patient’s prognosis, but introduces risk of suffering. Certain life-sustaining measures, such as chest compressions, vasopressors, hemodialysis, etc, may be considered non-beneficial by the health providers in light of this patient’s acute illness.  D/w Dr. Reeves pt on high flow nasal oxygen   dyspneic at rest   could use lose dose opioids for relief of dyspnea (would recommend dialudid 0.25)

## 2021-12-30 NOTE — PROGRESS NOTE ADULT - ASSESSMENT
Patient is a 89F with a PMH of HTN who presents to the ED for dyspnea.  Patient currently AAOx2, able to provide limited history.  Patient admitted  for COVID19.  Has had symptoms for 7 days.  Symptoms include fever, chills, cough, generalized weakness and increased dyspnea.  Cough has been nonproductive.  Patient states that she has not received a COVID vaccine.  Nonsmoker, NKDA.  Current SpO2 96% on 50L O2 via high flow NC.  Labs show elevated creatinine and elevated troponin.

## 2021-12-30 NOTE — PROGRESS NOTE ADULT - ASSESSMENT
Patient is a 89F with a PMH of HTN who presents to the ED for dyspnea.  Patient currently AAOx2, able to provide limited history.  Patient admitted  for COVID19.  Has had symptoms for 7 days.  Symptoms include fever, chills, cough, generalized weakness and increased dyspnea.  Cough has been nonproductive.  Patient states that she has not received a COVID vaccine to admitted but told this writer that she received 2 doses of the vaccine where an agency came into her home to give her the shot,   Nonsmoker, NKDA.  Current SpO2 96% on 50L O2 via high flow NC.    Labs show elevated creatinine and elevated troponin.    CXR (I personally reviewed) bilateral infiltrates   covid positive with KERI   Discussed with renal who is in agreement to start RDV and monitor renal issues    Remdesivir has not been shown to impact mortality. Thus far it has been shown to accomplish is decrease the length of hospitalization in patients with severe disease. In patient with moderate disease data showed clinical improvement in patient treated with 5 days of remdesivir, but not those treated for 10 days.    Criteria for use include:  • SpO2 < 94% on room air, OR requiring supplemental oxygen, OR requiring invasive mechanical ventilation, OR requiring ECMO (e.g moderate to critical disease)  • eGFR > 30 mL/min  • ALT < 5X ULN    Contraindications  • Use during pregnancy unless the potential benefits justify the potential risk for the mother and the fetus.  • Remdesivir should not be initiated in patients with ALT greater than or equal to 5 times the upper limit of normal (ULN) of baseline.  • Use in patients with renal impairment is based on potential risk/benefit considerations.  Remdesivir Dosing  • Adult patients greater than or equal to 40 k mG IV x 1 dose on day 1, followed by 100 mG IV q24h.  • Administration of Remdesivir in patients with eGFR less than 30 mL/min should be considered if the potential benefits outweigh the potential risks. There is a potential accumulation of cyclodextrin excipient found in Remdesivir.    : still on HFNC, creatinine remains high, palliative care called and need to find surrogate decision maker, will continue RDV and decadron     COVID  ARF  KERI  Hypertension    Plan:   #COVID  Monitor clinically.  Monitor Oxygenation  O2 supplementation.  Remdesivir - up to 5 days depending on clinical course.  Monitor CBC, CMP daily  Ferritin, CRP, and D dimer q 48 hrs.  IV Dexamethasone 6mg q24hrs x10 days if hypoxia.  Anticoagulation per protocol-heparin  Monitor for any bacterial superinfection/complications.  This tx plan was formulated utilizing my clinical judgement, currently available local/regional anecdotal information, organizational treatment recommendations with COVID-19 specific considerations given rapidly changing information available.   would not give tocilizumab when patient has level of confusion. one needs to be bacterial infections free and hx of diverticulitis which would also exclude. She was unable to tell me if all this info is correct or if she ever had diverticulitis. risks here outweighs the small benefits of the medication     #Acute Respiratory Failure  wean oxygen as tolerated  chest PT  routine PT  OOB to chair if able   incentive spirometer   decadron 6mg q24hrs for 10 days  Inhalers-MDI and avoid nebulizers except in negative pressure room     #KERI  renal onboard   IV hydration  avoid nephrotoxic agents  monitor ins and outs if feasible     #HTN  management per medicine  avoid extremes     Dr. Arevalo will be covering this weekend. To reach him, please call [phone]     D/W Dr. Javier Prasad, DO  Infectious Disease Attending  Pager [phone]  After 5pm/weekends please call [phone] for all inquiries and new consults

## 2021-12-30 NOTE — CONSULT NOTE ADULT - ASSESSMENT
Patient is a 89F with a PMH of HTN who presents to the ED for dyspnea.  Patient currently AAOx2, able to provide limited history.  Patient admitted  for COVID19.  Has had symptoms for 7 days.  Symptoms include fever, chills, cough, generalized weakness and increased dyspnea.  Cough has been nonproductive.  Patient states that she has not received a COVID vaccine to admitted but told this writer that she received 2 doses of the vaccine where an agency came into her home to give her the shot,   Nonsmoker, NKDA.  Current SpO2 96% on 50L O2 via high flow NC.    Labs show elevated creatinine and elevated troponin.    CXR (I personally reviewed) bilateral infiltrates   covid positive with KERI   Discussed with renal who is in agreement to start RDV and monitor renal issues    Remdesivir has not been shown to impact mortality. Thus far it has been shown to accomplish is decrease the length of hospitalization in patients with severe disease. In patient with moderate disease data showed clinical improvement in patient treated with 5 days of remdesivir, but not those treated for 10 days.    Criteria for use include:  • SpO2 < 94% on room air, OR requiring supplemental oxygen, OR requiring invasive mechanical ventilation, OR requiring ECMO (e.g moderate to critical disease)  • eGFR > 30 mL/min  • ALT < 5X ULN    Contraindications  • Use during pregnancy unless the potential benefits justify the potential risk for the mother and the fetus.  • Remdesivir should not be initiated in patients with ALT greater than or equal to 5 times the upper limit of normal (ULN) of baseline.  • Use in patients with renal impairment is based on potential risk/benefit considerations.  Remdesivir Dosing  • Adult patients greater than or equal to 40 k mG IV x 1 dose on day 1, followed by 100 mG IV q24h.  • Administration of Remdesivir in patients with eGFR less than 30 mL/min should be considered if the potential benefits outweigh the potential risks. There is a potential accumulation of cyclodextrin excipient found in Remdesivir.    COVID  ARF  KERI  Hypertension    Plan:   #COVID  Monitor clinically.  Monitor Oxygenation  O2 supplementation.  Remdesivir - up to 5 days depending on clinical course.  Monitor CBC, CMP daily  Ferritin, CRP, and D dimer q 48 hrs.  IV Dexamethasone 6mg q24hrs x10 days if hypoxia.  Anticoagulation per protocol-heparin  Treatment options are limited-this as well as limitations of data discussed with pt.  Monitor for any bacterial superinfection/complications.  This tx plan was formulated utilizing my clinical judgement, currently available local/regional anecdotal information, organizational treatment recommendations with COVID-19 specific considerations given rapidly changing information available.   would not give tocilizumab when patient has level of confusion. one needs to be bacterial infections free and hx of diverticulitis which would also exclude. She was unable to tell me if all this info is correct or if she ever had diverticulitis. risks here)potential side effects and hair loss)    #Acute Respiratory Failure  wean oxygen as tolerated  chest PT  routine PT  OOB to chair if able   incentive spirometer   decadron 6mg q24hrs for 10 days  Inhalers-MDI and avoid nebulizers except in negative pressure room     #KERI  renal onboard   IV hydration  avoid nephrotoxic agents  monitor ins and outs if feasible     #HTN  management per medicine  avoid extremes 
THE CENTRAL ETHICAL ISSUES PRESENTED IN THIS CASE ARE A) RESPECTING PATIENT AUTONOMY AND B) PROVIDER BENEFICENCE/NON-MALEFICENCE DURING A PERIOD OF PANDEMIC WHERE SOCIAL JUSTICE CONCERNS FOR FAIR AND ETHICAL TREATMENT ARE TANTAMOUNT.  Essential to autonomy is determining if the patient truly does not have full decisional capacity but a limited one. Utilizing Baudilio’s framework1 for determining capacity, C-communication, U-understanding, A-appreciation and R-reasoning, and be said that thepatient has the ability to communicate, but lacks understanding, appreciation and reasoning of her medical situation, hence rendering her with very limited capacity at this time.   Since the patient currently has limited capacity, decisions about treatment that requires informed consent would fall on a surrogate decision-maker.2 In this case the patient has not identified any surrogate, two-physicians can make treatment decisions.  Yet the ethical question centers around the physician’s duty to determining the best course of action for this patient.  Provider beneficence/non-maleficence calls on a physician’s duty provide care to the patient where the benefits of the proposed tests and treatments would outweigh the risks of said tests and treatments (utilizing the concept of proportionality).   Generally, the bioethical principle of beneficence promotes the best possible health or quality of living or dying, with aggressive interventions when these help to prolong life with "good quality," with comfort care when cure and remission are not possible and the aim is to achieve the best "quality of life possible given the current circumstances". This principle tends to go hand-in-hand with the other bioethical principle non-maleficence or do no harm. This principle promotes the physician’s virtue to provide care that wouldn’t cause harm to the patient or provide care that would not be medically beneficial to the patient. Clinicians utilize the concept of proportionality, the balance between beneficence and non-maleficence, to determine the best interest of the patient. When deciding on treatments to offer a patient, physicians need to discern when the treatments would provide a medically indicated benefit and not impose any unnecessary risk or burden3.  In this case, the ethical conflict involves Ms. Kruger’s acute renal failure in the setting of COVID 19 and the etiology of such. Acute kidney injury (KERI) is one of the most frequent organ dysfunctions in the course of SIRS and shock and carries high morbidity/mortality. The pathophysiological mechanisms involved in the mario alberto of KERI in SIRS are frequently not related to hypoperfusion and ischemia and generally lead to an increase in creatine. Ms. 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    Another approach is to review this patient’s prognosis – almost certain progressive continual decline in health status regardless of intervention. And it is reasonable to consider palliation and burden of suffering. In honoring the patient’s moral status, the issue remains that the patient has a poor prognosis, will progressively deteriorate but requires comfort and ease of suffering. In this case, it may be morally challenging to watch this patient receive variable life sustaining treatments.  The health team is commended for their virtue in providing care treatment that has allowed this patient to survive to this point. In this specific case, in accordance with the PALLIATIVE CARE ACCESS ACT ([facility] SECTION 2997-D) The attending health care practitioner is required to provide patients with an acute and potentially life threatening disease (COVID 19, KERI and sepsis) where not only prognosis, risks and benefits of treatment options but also patient’s legal rights to symptom management. Fundamental to a palliative approach is the aim to reduce suffering and improve the quality of life for dying patients and their families through identification and management of pain and physical, social, psychological, spiritual, Zoroastrianism and cultural needs Patients with an acutely irreversible condition provide a special challenge to clinicians. The team is commended for considering the patient’s "unbefriended" situation and her inability to make certain decisions regarding her medical needs.   It is to be noted that physicians are under no legal or ethical obligation to offer treatments that would 1) provide no medically indicated benefit or 2) impose unnecessary risk or burden to the patient. This concept of proportionality encompasses hemodialysis and other life sustaining treatments as well.   CONCLUSION OR RECOMMENDATION  Although Ms. Kruger’s multiorgan system failure is likely secondary to SIRS, COVID 19, KERI this leads to the concern of the need for cardiopulmonary resuscitation and intubation. The patient herself is unable to process through these questions and is not asking for anything other than telling the team where she lives.   It would be prudent to assess a situation in which the decision to provide CPR arose emergently, the physicians are encouraged to assess the situation using their clinical judgment, bearing in mind what was stated above, that physicians are not obligated to perform procedures that are not in accordance with acceptable practice  where harm exceeds benefit. This judgment may be made during that moment, taking into account the patient’s clinical picture and prognosis.   It would also be prudent for the patient to have a social work consult to assist in further identifying her needs.  Ethics will remain available for any decision points as they occur.  Anamaria Ramirez D.Min., BSN-RN, APRIL-C Medical Ethics [phone]  Case report written by JOMAR Gold, BSN-RN, APRIL-C   More than 50% of the time of this consultation was spent in coordination of Care of Patient  
 89F with a PMH of HTN admitted for COVID PNA with hypoxia and renal failure. Palliative Care consulted for GOC.

## 2021-12-30 NOTE — CONSULT NOTE ADULT - SUBJECTIVE AND OBJECTIVE BOX
89 year old female with COVID 19. Past medical history is limited due to patient's inabilty to recall at this time. Currently patient with COVID-19 and acute kidney injury , Creatinine 4.54, as well as elevated D-Dimer. Concern over lack of emergency contacts and surrogate decision maker.     Patient resides at [address] [place] [address]. Address is listed as a [facility], with a phone number of  [phone]. Ethics left a message, During the patient's ED admission, an AIDE by the name of Caitlin Deal came in with the patient, Ethics left a message at the number listed [phone].    Full consult to follow with updated information.     Anamaria Ramirez D.Min., RN-BSN, [facility]-  Medical Ethics  [phone] Clinical summary:  89 year old female with COVID 19. Past medical history is limited due to patient's inability to recall at this time. Currently patient with COVID-19 and acute kidney injury , Creatinine 4.54, as well as elevated D-Dimer.   Oxygen needs are nasal cannula high flow at 45 liters per minute and an oxygen saturation of 94%.    Concern over lack of emergency contacts and surrogate decisionmaker. Paper chart review by ethics revealed that the Patient resides at [address] [place] [address]. Address is listed as a [facility], with a phone number of [phone].     Ethics assistance requested in a patient that is unbefriended and with COVID-19 and KERI.     Prognosis Estimate (survival in hrs, days, wks, mos, yrs): guarded to poor, COVID 19 and KERI   Patient Decision-Making Capacity: Has limited capacity  Patient Aware of: Diagnosis: No Prognosis: No   Name of medical decision-maker should patient lack capacity (relationship): NONE  Role: Health Care Agent Legal Surrogate Contact #(s)   Other Decision-Maker (i.e., HCA or Surrogate) Aware of: Diagnosis: Yes No Prognosis: Yes No   Other Stake-Holders:  Patient resides at [address] [place] [address]. Address is listed as a [facility], with a phone number of [phone]. During the patient's ED admission, an AIDE by the name of Caitlin Deal came in with the patient. [phone].  Evidence of Patient’s Preference of Life-Sustaining Treatment (Written or Oral): Unknown   Resuscitation status: DNR: No DNI:  No    DISCUSSION  Dec 30 1500 left messages for the [facility] staff,  as well as the AideCaitlin.     [id number] Ethics met with patient at bedside, patient alert, awake and hair well groomed. She does have poor dentition (lower jaw protrudes with broken and brown teeth and has very long fingernails. She has upper and lower extremity edema. She answered to her name and when asked where she was, she said she didn’t know, but offered that she lives in "[place]." When asked if she knew who brought her in, she shook her head. When asked if she was hungry, she shook her head, and said that she’s more thirsty. When asked if there were any family members, she shook her head and said, "all gone now" but didn’t elaborate further. Patient did not engage in any further conversation.     0930- left messages again for the aid as well as with the [facility] office.

## 2021-12-31 NOTE — PROGRESS NOTE ADULT - ASSESSMENT
89F with a PMH of HTN admitted for COVID PNA with hypoxia and renal failure. Palliative Care consulted for GOC.

## 2021-12-31 NOTE — PROGRESS NOTE ADULT - SUBJECTIVE AND OBJECTIVE BOX
Patient is a 89y old  Female who presents with a chief complaint of covid (30 Dec 2021 23:33)      OVERNIGHT EVENTS:    REVIEW OF SYSTEMS: denies chest pain/SOB, diaphoresis, no F/C, cough, dizziness, headache, blurry vision, nausea, vomiting, abdominal pain. All others review of systems negative     MEDICATIONS  (STANDING):  dexAMETHasone  Injectable 6 milliGRAM(s) IV Push daily  dextrose 5%. 1000 milliLiter(s) (100 mL/Hr) IV Continuous <Continuous>  heparin   Injectable 5000 Unit(s) SubCutaneous every 12 hours  HYDROmorphone  Injectable 0.25 milliGRAM(s) IV Push every 6 hours  remdesivir  IVPB   IV Intermittent   remdesivir  IVPB 100 milliGRAM(s) IV Intermittent every 24 hours    MEDICATIONS  (PRN):  acetaminophen     Tablet .. 650 milliGRAM(s) Oral every 6 hours PRN Temp greater or equal to 38C (100.4F), Moderate Pain (4 - 6)  ALBUTerol    90 MICROgram(s) HFA Inhaler 2 Puff(s) Inhalation every 6 hours PRN Wheezing      Allergies    No Known Allergies    Intolerances        T(F): 96.2 (12-31-21 @ 10:45), Max: 97.5 (12-30-21 @ 16:09)  HR: 90 (12-31-21 @ 12:16) (83 - 99)  BP: 111/59 (12-31-21 @ 10:45) (111/59 - 143/73)  RR: 18 (12-31-21 @ 12:16) (18 - 18)  SpO2: 93% (12-31-21 @ 12:16) (91% - 96%)  Wt(kg): --    PHYSICAL EXAM:  GENERAL: NAD  HEAD:  Atraumatic, Normocephalic  EYES: PERRLA, conjunctiva and sclera clear  ENMT: Moist mucous membranes  NECK: Supple, No JVD, Normal thyroid  NERVOUS SYSTEM:  Alert & Awake  CHEST/LUNG: Clear to percussion bilaterally;   HEART: Regular rate and rhythm;   ABDOMEN: Soft, Nontender, Nondistended; Bowel sounds present  EXTREMITIES:  no edema BL LE  SKIN: moist    LABS:                        11.1   7.39  )-----------( 91       ( 31 Dec 2021 06:55 )             35.7     12-31    149<H>  |  114<H>  |  120<H>  ----------------------------<  132<H>  4.3   |  24  |  4.15<H>    Ca    8.5      31 Dec 2021 06:55    TPro  6.1  /  Alb  1.9<L>  /  TBili  0.6  /  DBili  0.3  /  AST  45<H>  /  ALT  17  /  AlkPhos  51  12-31        Cultures;   CAPILLARY BLOOD GLUCOSE        Lipid panel:           RADIOLOGY & ADDITIONAL TESTS:    Imaging Personally Reviewed:  [x ] YES      Consultant(s) Notes Reviewed:  [x ] YES     Care Discussed with [x ] Consultants [X ] Patient [ ] Family  [x ]    [x ]  Other; RN

## 2021-12-31 NOTE — PROGRESS NOTE ADULT - PROBLEM SELECTOR PLAN 9
pt is advanced age with dementia, debility and COVID PNA in hypoxic respiratory failure and acute renal failure with no surrogate decision makers known. Pt would not benefit from advanced cardiac life support or mechanical ventilation as it would not change outcome and only produce suffering. Physicians are under no legal or ethical obligation to offer treatments that would 1) provide no medically indicated benefit or 2) impose unnecessary risk or burden to the patient. The benefits of each intervention offered including life sustaining measures must be weighed against the possible harm of each procedure or intervention when it no longer improves a patient’s prognosis, but introduces risk of suffering. Certain life-sustaining measures, such as chest compressions, vasopressors, hemodialysis, etc, may be considered non-beneficial by the health providers in light of this patient’s acute illness. I have asked ethics to comment, agree with no escalation in treatment from where we are at present.  D/w Dr. Reeves.

## 2021-12-31 NOTE — PROGRESS NOTE ADULT - SUBJECTIVE AND OBJECTIVE BOX
SUBJECTIVE AND OBJECTIVE: unchanged, complaining of having difficulty breathing   INTERVAL HPI/OVERNIGHT EVENTS:    DNR on chart: no  Allergies    No Known Allergies    Intolerances    MEDICATIONS  (STANDING):  dexAMETHasone  Injectable 6 milliGRAM(s) IV Push daily  dextrose 5%. 1000 milliLiter(s) (100 mL/Hr) IV Continuous <Continuous>  heparin   Injectable 5000 Unit(s) SubCutaneous every 12 hours  HYDROmorphone  Injectable 0.25 milliGRAM(s) IV Push every 6 hours  remdesivir  IVPB   IV Intermittent   remdesivir  IVPB 100 milliGRAM(s) IV Intermittent every 24 hours    MEDICATIONS  (PRN):  acetaminophen     Tablet .. 650 milliGRAM(s) Oral every 6 hours PRN Temp greater or equal to 38C (100.4F), Moderate Pain (4 - 6)  ALBUTerol    90 MICROgram(s) HFA Inhaler 2 Puff(s) Inhalation every 6 hours PRN Wheezing      ITEMS UNCHECKED ARE NOT PRESENT    PRESENT SYMPTOMS: [ ]Unable to obtain due to poor mentation   Source if other than patient:  [ ]Family   [ ]Team     Pain:  [ ]yes [ ]no  QOL impact -   Location -                    Aggravating factors -  Quality -  Radiation -  Timing-  Severity (0-10 scale):  Minimal acceptable level (0-10 scale):     Dyspnea:                           [ ]Mild [ ]Moderate [x ]Severe  Anxiety:                             [ ]Mild [ ]Moderate [ ]Severe  Fatigue:                             [ ]Mild [ ]Moderate [ ]Severe  Nausea:                             [ ]Mild [ ]Moderate [ ]Severe  Loss of appetite:              [ ]Mild [ ]Moderate [ ]Severe  Constipation:                    [ ]Mild [ ]Moderate [ ]Severe    CPOT:    https://www.[facility].org/getattachment/[email or web address]/Critical-Care-Pain-Observation-Tool-(CPOT)    PAIN AD Score:	  http://geriatrictoolkit.[place].[facility]/cog/painad.pdf (Ctrl + left click to view)    Other Symptoms:  [ ]All other review of systems negative     Palliative Performance Status Version 2:        20-30 %      http://[facility].org/files/news/palliative_performance_scale_ppsv2.pdf  PHYSICAL EXAM:  Vital Signs Last 24 Hrs  T(C): 35.7 (31 Dec 2021 10:45), Max: 36.4 (30 Dec 2021 16:09)  T(F): 96.2 (31 Dec 2021 10:45), Max: 97.5 (30 Dec 2021 16:09)  HR: 90 (31 Dec 2021 12:16) (83 - 99)  BP: 111/59 (31 Dec 2021 10:45) (111/59 - 143/73)  BP(mean): --  RR: 18 (31 Dec 2021 12:16) (18 - 18)  SpO2: 93% (31 Dec 2021 12:16) (91% - 96%) I&O's Summary    30 Dec 2021 07:01  -  31 Dec 2021 07:00  --------------------------------------------------------  IN: 60 mL / OUT: 200 mL / NET: -140 mL       GENERAL:  [ x]Alert  [ x]Oriented x 3  [ ]Lethargic  [ ]Cachexia  [ ]Unarousable  [ x]Verbal  [ ]Non-Verbal  Behavioral:   [ ]Anxiety  [ x]Delirium [ ]Agitation [ ]Other  HEENT:  [ ]Normal   [ x]Dry mouth   [ ]ET Tube/Trach  [ ]Oral lesions  PULMONARY: on high flow   [ ]Clear [x ]Tachypnea  [ x]Audible excessive secretions   [ ]Rhonchi        [ ]Right [ ]Left [ ]Bilateral  [ ]Crackles        [ ]Right [ ]Left [ ]Bilateral  [ ]Wheezing     [ ]Right [ ]Left [ ]Bilateral  [ ]Diminished BS [ ] Right [ ]Left [ ]Bilateral  CARDIOVASCULAR:    [ x]Regular [ ]Irregular [ ]Tachy  [ ]Darrian [ ]Murmur [ ]Other  GASTROINTESTINAL:  [ ]xSoft  [ ]Distended   [x ]+BS  [ ]Non tender [ ]Tender  [ ]PEG [ ]OGT/ NGT   Last BM:    GENITOURINARY:  [ ]Normal [ ]Incontinent   [ ]Oliguria/Anuria   [ ]Huang  MUSCULOSKELETAL:   [ ]Normal   [ ]Weakness  [x ]Bed/Wheelchair bound [ ]Edema  NEUROLOGIC:   [ ]No focal deficits  [x ] Cognitive impairment  [x ] Dysphagia [ ]Dysarthria [ ] Paresis [ ]Other   SKIN:   [ ]Normal  [ ]Rash   [ ]Pressure ulcer(s) [ ]y [ ]n present on admission    CRITICAL CARE:  [ ]Shock Present  [ ]Septic [ ]Cardiogenic [ ]Neurologic [ ]Hypovolemic  [ ]Vasopressors [ ]Inotropes  [ ]Respiratory failure present [ ]Mechanical Ventilation [ ]Non-invasive ventilatory support [ ]High-Flow   [ ]Acute  [ ]Chronic [ ]Hypoxic  [ ]Hypercarbic [ ]Other  [ ]Other organ failure     LABS:                        11.1   7.39  )-----------( 91       ( 31 Dec 2021 06:55 )             35.7   12-31    149<H>  |  114<H>  |  120<H>  ----------------------------<  132<H>  4.3   |  24  |  4.15<H>    Ca    8.5      31 Dec 2021 06:55    TPro  6.1  /  Alb  1.9<L>  /  TBili  0.6  /  DBili  0.3  /  AST  45<H>  /  ALT  17  /  AlkPhos  51  12-31        RADIOLOGY & ADDITIONAL STUDIES:    Protein Calorie Malnutrition Present: [ ]mild [ ]moderate [ ]severe [ ]underweight [ ]morbid obesity  https://www.andeal.org/vault/2440/web/files/ONC/Table_Clinical%20Characteristics%20to%20Document%20Malnutrition-White%20JV%20et%20al%202012.pdf    Height (cm): 167.6 (12-29-21 @ 10:33)  Weight (kg): 74.2 (12-29-21 @ 10:33)  BMI (kg/m2): 26.4 (12-29-21 @ 10:33)    [ ]PPSV2 < or = 30%  [ ]significant weight loss [ ]poor nutritional intake [ ]anasarca    [ ]Artificial Nutrition    REFERRALS:   [ ]Chaplaincy  [ ]Hospice  [ ]Child Life  [ ]Social Work  [ ]Case management [ ]Holistic Therapy     Goals of Care Document:

## 2021-12-31 NOTE — PROGRESS NOTE ADULT - SUBJECTIVE AND OBJECTIVE BOX
[facility] NEPHROLOGY SERVICES, [facility]  NEPHROLOGY AND HYPERTENSION  [address]  [address]  [place] [address]  [phone]    MD JOANIE ESPINAL, MD NAEEM VAZQUEZ, MD ELIZA PURVIS, MD BROCK VERNON, MD ROMELIA MCFADDEN, MD DIONICIO JEFFERS MD          Patient events noted  No distress  on high flow    MEDICATIONS  (STANDING):  dexAMETHasone  Injectable 6 milliGRAM(s) IV Push daily  dextrose 5%. 1000 milliLiter(s) (100 mL/Hr) IV Continuous <Continuous>  heparin   Injectable 5000 Unit(s) SubCutaneous every 12 hours  HYDROmorphone  Injectable 0.25 milliGRAM(s) IV Push every 6 hours  remdesivir  IVPB   IV Intermittent   remdesivir  IVPB 100 milliGRAM(s) IV Intermittent every 24 hours    MEDICATIONS  (PRN):  acetaminophen     Tablet .. 650 milliGRAM(s) Oral every 6 hours PRN Temp greater or equal to 38C (100.4F), Moderate Pain (4 - 6)  ALBUTerol    90 MICROgram(s) HFA Inhaler 2 Puff(s) Inhalation every 6 hours PRN Wheezing      12-30-21 @ 07:01  -  12-31-21 @ 07:00  --------------------------------------------------------  IN: 60 mL / OUT: 200 mL / NET: -140 mL    12-31-21 @ 07:01  -  12-31-21 @ 23:27  --------------------------------------------------------  IN: 120 mL / OUT: 0 mL / NET: 120 mL      PHYSICAL EXAM:      T(C): 36.3 (12-31-21 @ 16:14), Max: 36.3 (12-31-21 @ 04:32)  HR: 84 (12-31-21 @ 21:46) (83 - 99)  BP: 143/84 (12-31-21 @ 16:14) (111/59 - 143/84)  RR: 18 (12-31-21 @ 16:17) (18 - 18)  SpO2: 92% (12-31-21 @ 21:46) (91% - 96%)  Wt(kg): --  Lungs clear ant; decreased BS at bases  Heart S1S2  Abd soft NT ND  Extremities:   tr edema                                    11.1   7.39  )-----------( 91       ( 31 Dec 2021 06:55 )             35.7     12-31    149<H>  |  114<H>  |  120<H>  ----------------------------<  132<H>  4.3   |  24  |  4.15<H>    Ca    8.5      31 Dec 2021 06:55    TPro  6.1  /  Alb  1.9<L>  /  TBili  0.6  /  DBili  0.3  /  AST  45<H>  /  ALT  17  /  AlkPhos  51  12-31      LIVER FUNCTIONS - ( 31 Dec 2021 06:55 )  Alb: 1.9 g/dL / Pro: 6.1 gm/dL / ALK PHOS: 51 U/L / ALT: 17 U/L / AST: 45 U/L / GGT: x           Creatinine Trend: 4.15<--, 4.56<--, 4.57<--, 4.81<--, 5.02<--    Assessment        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

## 2022-01-01 VITALS — RESPIRATION RATE: 14 BRPM | HEART RATE: 97 BPM | OXYGEN SATURATION: 69 %

## 2022-01-01 DIAGNOSIS — U07.1 COVID-19: ICD-10-CM

## 2022-01-01 LAB
ALBUMIN SERPL ELPH-MCNC: 1.6 G/DL — LOW (ref 3.3–5)
ALBUMIN SERPL ELPH-MCNC: 1.6 G/DL — LOW (ref 3.3–5)
ALBUMIN SERPL ELPH-MCNC: 1.8 G/DL — LOW (ref 3.3–5)
ALBUMIN SERPL ELPH-MCNC: 2 G/DL — LOW (ref 3.3–5)
ALBUMIN SERPL ELPH-MCNC: 2 G/DL — LOW (ref 3.3–5)
ALP SERPL-CCNC: 101 U/L — SIGNIFICANT CHANGE UP (ref 40–120)
ALP SERPL-CCNC: 70 U/L — SIGNIFICANT CHANGE UP (ref 40–120)
ALP SERPL-CCNC: 71 U/L — SIGNIFICANT CHANGE UP (ref 40–120)
ALP SERPL-CCNC: 97 U/L — SIGNIFICANT CHANGE UP (ref 40–120)
ALP SERPL-CCNC: 97 U/L — SIGNIFICANT CHANGE UP (ref 40–120)
ALT FLD-CCNC: 14 U/L — SIGNIFICANT CHANGE UP (ref 12–78)
ALT FLD-CCNC: 14 U/L — SIGNIFICANT CHANGE UP (ref 12–78)
ALT FLD-CCNC: 16 U/L — SIGNIFICANT CHANGE UP (ref 12–78)
ALT FLD-CCNC: 17 U/L — SIGNIFICANT CHANGE UP (ref 12–78)
ALT FLD-CCNC: 18 U/L — SIGNIFICANT CHANGE UP (ref 12–78)
ANION GAP SERPL CALC-SCNC: 11 MMOL/L — SIGNIFICANT CHANGE UP (ref 5–17)
ANION GAP SERPL CALC-SCNC: 12 MMOL/L — SIGNIFICANT CHANGE UP (ref 5–17)
ANION GAP SERPL CALC-SCNC: 12 MMOL/L — SIGNIFICANT CHANGE UP (ref 5–17)
ANION GAP SERPL CALC-SCNC: 8 MMOL/L — SIGNIFICANT CHANGE UP (ref 5–17)
AST SERPL-CCNC: 32 U/L — SIGNIFICANT CHANGE UP (ref 15–37)
AST SERPL-CCNC: 35 U/L — SIGNIFICANT CHANGE UP (ref 15–37)
AST SERPL-CCNC: 41 U/L — HIGH (ref 15–37)
BASOPHILS # BLD AUTO: 0 K/UL — SIGNIFICANT CHANGE UP (ref 0–0.2)
BASOPHILS NFR BLD AUTO: 0 % — SIGNIFICANT CHANGE UP (ref 0–2)
BILIRUB DIRECT SERPL-MCNC: 0.4 MG/DL — HIGH (ref 0–0.3)
BILIRUB DIRECT SERPL-MCNC: 0.4 MG/DL — HIGH (ref 0–0.3)
BILIRUB DIRECT SERPL-MCNC: 0.5 MG/DL — HIGH (ref 0–0.3)
BILIRUB DIRECT SERPL-MCNC: 0.5 MG/DL — HIGH (ref 0–0.3)
BILIRUB INDIRECT FLD-MCNC: 0.3 MG/DL — SIGNIFICANT CHANGE UP (ref 0.2–1)
BILIRUB INDIRECT FLD-MCNC: 0.3 MG/DL — SIGNIFICANT CHANGE UP (ref 0.2–1)
BILIRUB INDIRECT FLD-MCNC: 0.5 MG/DL — SIGNIFICANT CHANGE UP (ref 0.2–1)
BILIRUB INDIRECT FLD-MCNC: 0.5 MG/DL — SIGNIFICANT CHANGE UP (ref 0.2–1)
BILIRUB SERPL-MCNC: 0.7 MG/DL — SIGNIFICANT CHANGE UP (ref 0.2–1.2)
BILIRUB SERPL-MCNC: 0.7 MG/DL — SIGNIFICANT CHANGE UP (ref 0.2–1.2)
BILIRUB SERPL-MCNC: 1 MG/DL — SIGNIFICANT CHANGE UP (ref 0.2–1.2)
BUN SERPL-MCNC: 127 MG/DL — HIGH (ref 7–23)
BUN SERPL-MCNC: 132 MG/DL — HIGH (ref 7–23)
BUN SERPL-MCNC: 140 MG/DL — HIGH (ref 7–23)
BUN SERPL-MCNC: 157 MG/DL — SIGNIFICANT CHANGE UP (ref 7–23)
CALCIUM SERPL-MCNC: 8 MG/DL — LOW (ref 8.5–10.1)
CALCIUM SERPL-MCNC: 8.2 MG/DL — LOW (ref 8.5–10.1)
CALCIUM SERPL-MCNC: 8.3 MG/DL — LOW (ref 8.5–10.1)
CALCIUM SERPL-MCNC: 8.4 MG/DL — LOW (ref 8.5–10.1)
CHLORIDE SERPL-SCNC: 102 MMOL/L — SIGNIFICANT CHANGE UP (ref 96–108)
CHLORIDE SERPL-SCNC: 104 MMOL/L — SIGNIFICANT CHANGE UP (ref 96–108)
CHLORIDE SERPL-SCNC: 107 MMOL/L — SIGNIFICANT CHANGE UP (ref 96–108)
CHLORIDE SERPL-SCNC: 109 MMOL/L — HIGH (ref 96–108)
CO2 SERPL-SCNC: 22 MMOL/L — SIGNIFICANT CHANGE UP (ref 22–31)
CO2 SERPL-SCNC: 22 MMOL/L — SIGNIFICANT CHANGE UP (ref 22–31)
CO2 SERPL-SCNC: 24 MMOL/L — SIGNIFICANT CHANGE UP (ref 22–31)
CO2 SERPL-SCNC: 27 MMOL/L — SIGNIFICANT CHANGE UP (ref 22–31)
CREAT SERPL-MCNC: 3.89 MG/DL — HIGH (ref 0.5–1.3)
CREAT SERPL-MCNC: 3.91 MG/DL — HIGH (ref 0.5–1.3)
CREAT SERPL-MCNC: 4.03 MG/DL — HIGH (ref 0.5–1.3)
CREAT SERPL-MCNC: 4.07 MG/DL — HIGH (ref 0.5–1.3)
CREAT SERPL-MCNC: 4.07 MG/DL — HIGH (ref 0.5–1.3)
CREAT SERPL-MCNC: 4.58 MG/DL — HIGH (ref 0.5–1.3)
CREAT SERPL-MCNC: 4.58 MG/DL — HIGH (ref 0.5–1.3)
CRP SERPL-MCNC: 113 MG/L — HIGH
CULTURE RESULTS: SIGNIFICANT CHANGE UP
CULTURE RESULTS: SIGNIFICANT CHANGE UP
D DIMER BLD IA.RAPID-MCNC: 486 NG/ML DDU — HIGH
D DIMER BLD IA.RAPID-MCNC: 516 NG/ML DDU — HIGH
EOSINOPHIL # BLD AUTO: 0 K/UL — SIGNIFICANT CHANGE UP (ref 0–0.5)
EOSINOPHIL NFR BLD AUTO: 0 % — SIGNIFICANT CHANGE UP (ref 0–6)
FERRITIN SERPL-MCNC: 1165 NG/ML — HIGH (ref 15–150)
FERRITIN SERPL-MCNC: 942 NG/ML — HIGH (ref 15–150)
GLUCOSE BLDC GLUCOMTR-MCNC: 113 MG/DL — HIGH (ref 70–99)
GLUCOSE BLDC GLUCOMTR-MCNC: 99 MG/DL — SIGNIFICANT CHANGE UP (ref 70–99)
GLUCOSE SERPL-MCNC: 108 MG/DL — HIGH (ref 70–99)
GLUCOSE SERPL-MCNC: 140 MG/DL — HIGH (ref 70–99)
GLUCOSE SERPL-MCNC: 91 MG/DL — SIGNIFICANT CHANGE UP (ref 70–99)
GLUCOSE SERPL-MCNC: 92 MG/DL — SIGNIFICANT CHANGE UP (ref 70–99)
HCT VFR BLD CALC: 34.2 % — LOW (ref 34.5–45)
HCT VFR BLD CALC: 35.1 % — SIGNIFICANT CHANGE UP (ref 34.5–45)
HCT VFR BLD CALC: 36.3 % — SIGNIFICANT CHANGE UP (ref 34.5–45)
HCT VFR BLD CALC: 38.1 % — SIGNIFICANT CHANGE UP (ref 34.5–45)
HGB BLD-MCNC: 11 G/DL — LOW (ref 11.5–15.5)
HGB BLD-MCNC: 11.2 G/DL — LOW (ref 11.5–15.5)
HGB BLD-MCNC: 11.4 G/DL — LOW (ref 11.5–15.5)
HGB BLD-MCNC: 12.4 G/DL — SIGNIFICANT CHANGE UP (ref 11.5–15.5)
LDH SERPL L TO P-CCNC: 531 U/L — HIGH (ref 50–242)
LYMPHOCYTES # BLD AUTO: 1.77 K/UL — SIGNIFICANT CHANGE UP (ref 1–3.3)
LYMPHOCYTES # BLD AUTO: 7 % — LOW (ref 13–44)
MAGNESIUM SERPL-MCNC: 1.9 MG/DL — SIGNIFICANT CHANGE UP (ref 1.6–2.6)
MCHC RBC-ENTMCNC: 30.9 PG — SIGNIFICANT CHANGE UP (ref 27–34)
MCHC RBC-ENTMCNC: 31 PG — SIGNIFICANT CHANGE UP (ref 27–34)
MCHC RBC-ENTMCNC: 31.4 GM/DL — LOW (ref 32–36)
MCHC RBC-ENTMCNC: 31.4 PG — SIGNIFICANT CHANGE UP (ref 27–34)
MCHC RBC-ENTMCNC: 31.4 PG — SIGNIFICANT CHANGE UP (ref 27–34)
MCHC RBC-ENTMCNC: 31.9 GM/DL — LOW (ref 32–36)
MCHC RBC-ENTMCNC: 32.2 GM/DL — SIGNIFICANT CHANGE UP (ref 32–36)
MCHC RBC-ENTMCNC: 32.5 GM/DL — SIGNIFICANT CHANGE UP (ref 32–36)
MCV RBC AUTO: 96.5 FL — SIGNIFICANT CHANGE UP (ref 80–100)
MCV RBC AUTO: 97.2 FL — SIGNIFICANT CHANGE UP (ref 80–100)
MCV RBC AUTO: 97.7 FL — SIGNIFICANT CHANGE UP (ref 80–100)
MCV RBC AUTO: 98.4 FL — SIGNIFICANT CHANGE UP (ref 80–100)
MONOCYTES # BLD AUTO: 1.01 K/UL — HIGH (ref 0–0.9)
MONOCYTES NFR BLD AUTO: 4 % — SIGNIFICANT CHANGE UP (ref 2–14)
NEUTROPHILS # BLD AUTO: 22.28 K/UL — HIGH (ref 1.8–7.4)
NEUTROPHILS NFR BLD AUTO: 87 % — HIGH (ref 43–77)
NRBC # BLD: 0 /100 WBCS — SIGNIFICANT CHANGE UP (ref 0–0)
NRBC # BLD: SIGNIFICANT CHANGE UP /100 WBCS (ref 0–0)
PHOSPHATE SERPL-MCNC: 6.7 MG/DL — HIGH (ref 2.5–4.5)
PLATELET # BLD AUTO: 101 K/UL — LOW (ref 150–400)
PLATELET # BLD AUTO: 108 K/UL — LOW (ref 150–400)
PLATELET # BLD AUTO: 130 K/UL — LOW (ref 150–400)
PLATELET # BLD AUTO: 139 K/UL — LOW (ref 150–400)
POTASSIUM SERPL-MCNC: 4.5 MMOL/L — SIGNIFICANT CHANGE UP (ref 3.5–5.3)
POTASSIUM SERPL-MCNC: 4.8 MMOL/L — SIGNIFICANT CHANGE UP (ref 3.5–5.3)
POTASSIUM SERPL-MCNC: 4.8 MMOL/L — SIGNIFICANT CHANGE UP (ref 3.5–5.3)
POTASSIUM SERPL-MCNC: 5.1 MMOL/L — SIGNIFICANT CHANGE UP (ref 3.5–5.3)
POTASSIUM SERPL-SCNC: 4.5 MMOL/L — SIGNIFICANT CHANGE UP (ref 3.5–5.3)
POTASSIUM SERPL-SCNC: 4.8 MMOL/L — SIGNIFICANT CHANGE UP (ref 3.5–5.3)
POTASSIUM SERPL-SCNC: 4.8 MMOL/L — SIGNIFICANT CHANGE UP (ref 3.5–5.3)
POTASSIUM SERPL-SCNC: 5.1 MMOL/L — SIGNIFICANT CHANGE UP (ref 3.5–5.3)
PROT SERPL-MCNC: 5.8 GM/DL — LOW (ref 6–8.3)
PROT SERPL-MCNC: 6.2 GM/DL — SIGNIFICANT CHANGE UP (ref 6–8.3)
PROT SERPL-MCNC: 6.4 GM/DL — SIGNIFICANT CHANGE UP (ref 6–8.3)
RBC # BLD: 3.5 M/UL — LOW (ref 3.8–5.2)
RBC # BLD: 3.61 M/UL — LOW (ref 3.8–5.2)
RBC # BLD: 3.69 M/UL — LOW (ref 3.8–5.2)
RBC # BLD: 3.95 M/UL — SIGNIFICANT CHANGE UP (ref 3.8–5.2)
RBC # FLD: 13.3 % — SIGNIFICANT CHANGE UP (ref 10.3–14.5)
RBC # FLD: 13.6 % — SIGNIFICANT CHANGE UP (ref 10.3–14.5)
RBC # FLD: 13.9 % — SIGNIFICANT CHANGE UP (ref 10.3–14.5)
RBC # FLD: 14.1 % — SIGNIFICANT CHANGE UP (ref 10.3–14.5)
SODIUM SERPL-SCNC: 136 MMOL/L — SIGNIFICANT CHANGE UP (ref 135–145)
SODIUM SERPL-SCNC: 138 MMOL/L — SIGNIFICANT CHANGE UP (ref 135–145)
SODIUM SERPL-SCNC: 142 MMOL/L — SIGNIFICANT CHANGE UP (ref 135–145)
SODIUM SERPL-SCNC: 144 MMOL/L — SIGNIFICANT CHANGE UP (ref 135–145)
SPECIMEN SOURCE: SIGNIFICANT CHANGE UP
SPECIMEN SOURCE: SIGNIFICANT CHANGE UP
WBC # BLD: 14.04 K/UL — HIGH (ref 3.8–10.5)
WBC # BLD: 19.52 K/UL — HIGH (ref 3.8–10.5)
WBC # BLD: 21.37 K/UL — HIGH (ref 3.8–10.5)
WBC # BLD: 25.32 K/UL — HIGH (ref 3.8–10.5)
WBC # FLD AUTO: 14.04 K/UL — HIGH (ref 3.8–10.5)
WBC # FLD AUTO: 19.52 K/UL — HIGH (ref 3.8–10.5)
WBC # FLD AUTO: 21.37 K/UL — HIGH (ref 3.8–10.5)
WBC # FLD AUTO: 25.32 K/UL — HIGH (ref 3.8–10.5)

## 2022-01-01 PROCEDURE — 99233 SBSQ HOSP IP/OBS HIGH 50: CPT

## 2022-01-01 PROCEDURE — 99232 SBSQ HOSP IP/OBS MODERATE 35: CPT

## 2022-01-01 RX ORDER — SODIUM CHLORIDE 9 MG/ML
1000 INJECTION INTRAMUSCULAR; INTRAVENOUS; SUBCUTANEOUS
Refills: 0 | Status: DISCONTINUED | OUTPATIENT
Start: 2022-01-01 | End: 2022-01-01

## 2022-01-01 RX ORDER — HYDROMORPHONE HYDROCHLORIDE 2 MG/ML
0.5 INJECTION INTRAMUSCULAR; INTRAVENOUS; SUBCUTANEOUS EVERY 6 HOURS
Refills: 0 | Status: DISCONTINUED | OUTPATIENT
Start: 2022-01-01 | End: 2022-01-01

## 2022-01-01 RX ORDER — DEXAMETHASONE 0.5 MG/5ML
10 ELIXIR ORAL ONCE
Refills: 0 | Status: COMPLETED | OUTPATIENT
Start: 2022-01-01 | End: 2022-01-01

## 2022-01-01 RX ORDER — SODIUM CHLORIDE 9 MG/ML
500 INJECTION INTRAMUSCULAR; INTRAVENOUS; SUBCUTANEOUS ONCE
Refills: 0 | Status: COMPLETED | OUTPATIENT
Start: 2022-01-01 | End: 2022-01-01

## 2022-01-01 RX ORDER — DEXAMETHASONE 0.5 MG/5ML
10 ELIXIR ORAL ONCE
Refills: 0 | Status: DISCONTINUED | OUTPATIENT
Start: 2022-01-01 | End: 2022-01-01

## 2022-01-01 RX ORDER — NYSTATIN CREAM 100000 [USP'U]/G
1 CREAM TOPICAL
Refills: 0 | Status: DISCONTINUED | OUTPATIENT
Start: 2022-01-01 | End: 2022-01-01

## 2022-01-01 RX ORDER — DEXAMETHASONE 0.5 MG/5ML
6 ELIXIR ORAL ONCE
Refills: 0 | Status: COMPLETED | OUTPATIENT
Start: 2022-01-01 | End: 2022-01-01

## 2022-01-01 RX ORDER — MORPHINE SULFATE 50 MG/1
2 CAPSULE, EXTENDED RELEASE ORAL ONCE
Refills: 0 | Status: DISCONTINUED | OUTPATIENT
Start: 2022-01-01 | End: 2022-01-01

## 2022-01-01 RX ORDER — HYDROMORPHONE HYDROCHLORIDE 2 MG/ML
1 INJECTION INTRAMUSCULAR; INTRAVENOUS; SUBCUTANEOUS
Refills: 0 | Status: DISCONTINUED | OUTPATIENT
Start: 2022-01-01 | End: 2022-01-01

## 2022-01-01 RX ORDER — SCOPALAMINE 1 MG/3D
1 PATCH, EXTENDED RELEASE TRANSDERMAL
Refills: 0 | Status: DISCONTINUED | OUTPATIENT
Start: 2022-01-01 | End: 2022-01-01

## 2022-01-01 RX ORDER — ACETAMINOPHEN 500 MG
650 TABLET ORAL EVERY 6 HOURS
Refills: 0 | Status: DISCONTINUED | OUTPATIENT
Start: 2022-01-01 | End: 2022-01-01

## 2022-01-01 RX ADMIN — HYDROMORPHONE HYDROCHLORIDE 0.5 MILLIGRAM(S): 2 INJECTION INTRAMUSCULAR; INTRAVENOUS; SUBCUTANEOUS at 01:23

## 2022-01-01 RX ADMIN — SCOPALAMINE 1 PATCH: 1 PATCH, EXTENDED RELEASE TRANSDERMAL at 21:32

## 2022-01-01 RX ADMIN — HYDROMORPHONE HYDROCHLORIDE 0.25 MILLIGRAM(S): 2 INJECTION INTRAMUSCULAR; INTRAVENOUS; SUBCUTANEOUS at 06:15

## 2022-01-01 RX ADMIN — REMDESIVIR 500 MILLIGRAM(S): 5 INJECTION INTRAVENOUS at 18:15

## 2022-01-01 RX ADMIN — HYDROMORPHONE HYDROCHLORIDE 0.5 MILLIGRAM(S): 2 INJECTION INTRAMUSCULAR; INTRAVENOUS; SUBCUTANEOUS at 00:26

## 2022-01-01 RX ADMIN — HYDROMORPHONE HYDROCHLORIDE 0.25 MILLIGRAM(S): 2 INJECTION INTRAMUSCULAR; INTRAVENOUS; SUBCUTANEOUS at 12:41

## 2022-01-01 RX ADMIN — NYSTATIN CREAM 1 APPLICATION(S): 100000 CREAM TOPICAL at 17:17

## 2022-01-01 RX ADMIN — HYDROMORPHONE HYDROCHLORIDE 0.5 MILLIGRAM(S): 2 INJECTION INTRAMUSCULAR; INTRAVENOUS; SUBCUTANEOUS at 17:14

## 2022-01-01 RX ADMIN — HYDROMORPHONE HYDROCHLORIDE 0.25 MILLIGRAM(S): 2 INJECTION INTRAMUSCULAR; INTRAVENOUS; SUBCUTANEOUS at 11:28

## 2022-01-01 RX ADMIN — Medication 6 MILLIGRAM(S): at 06:12

## 2022-01-01 RX ADMIN — SCOPALAMINE 1 PATCH: 1 PATCH, EXTENDED RELEASE TRANSDERMAL at 20:01

## 2022-01-01 RX ADMIN — HYDROMORPHONE HYDROCHLORIDE 0.25 MILLIGRAM(S): 2 INJECTION INTRAMUSCULAR; INTRAVENOUS; SUBCUTANEOUS at 06:54

## 2022-01-01 RX ADMIN — SODIUM CHLORIDE 100 MILLILITER(S): 9 INJECTION INTRAMUSCULAR; INTRAVENOUS; SUBCUTANEOUS at 10:30

## 2022-01-01 RX ADMIN — HEPARIN SODIUM 5000 UNIT(S): 5000 INJECTION INTRAVENOUS; SUBCUTANEOUS at 18:14

## 2022-01-01 RX ADMIN — HYDROMORPHONE HYDROCHLORIDE 0.25 MILLIGRAM(S): 2 INJECTION INTRAMUSCULAR; INTRAVENOUS; SUBCUTANEOUS at 23:24

## 2022-01-01 RX ADMIN — Medication 6 MILLIGRAM(S): at 03:21

## 2022-01-01 RX ADMIN — HYDROMORPHONE HYDROCHLORIDE 0.25 MILLIGRAM(S): 2 INJECTION INTRAMUSCULAR; INTRAVENOUS; SUBCUTANEOUS at 11:30

## 2022-01-01 RX ADMIN — Medication 6 MILLIGRAM(S): at 05:52

## 2022-01-01 RX ADMIN — Medication 10 MILLIGRAM(S): at 21:30

## 2022-01-01 RX ADMIN — HYDROMORPHONE HYDROCHLORIDE 0.25 MILLIGRAM(S): 2 INJECTION INTRAMUSCULAR; INTRAVENOUS; SUBCUTANEOUS at 23:04

## 2022-01-01 RX ADMIN — NYSTATIN CREAM 1 APPLICATION(S): 100000 CREAM TOPICAL at 17:54

## 2022-01-01 RX ADMIN — SODIUM CHLORIDE 100 MILLILITER(S): 9 INJECTION, SOLUTION INTRAVENOUS at 06:16

## 2022-01-01 RX ADMIN — HYDROMORPHONE HYDROCHLORIDE 1 MILLIGRAM(S): 2 INJECTION INTRAMUSCULAR; INTRAVENOUS; SUBCUTANEOUS at 20:13

## 2022-01-01 RX ADMIN — HYDROMORPHONE HYDROCHLORIDE 0.25 MILLIGRAM(S): 2 INJECTION INTRAMUSCULAR; INTRAVENOUS; SUBCUTANEOUS at 05:19

## 2022-01-01 RX ADMIN — SCOPALAMINE 1 PATCH: 1 PATCH, EXTENDED RELEASE TRANSDERMAL at 14:52

## 2022-01-01 RX ADMIN — HYDROMORPHONE HYDROCHLORIDE 0.25 MILLIGRAM(S): 2 INJECTION INTRAMUSCULAR; INTRAVENOUS; SUBCUTANEOUS at 02:48

## 2022-01-01 RX ADMIN — HEPARIN SODIUM 5000 UNIT(S): 5000 INJECTION INTRAVENOUS; SUBCUTANEOUS at 17:16

## 2022-01-01 RX ADMIN — SODIUM CHLORIDE 100 MILLILITER(S): 9 INJECTION, SOLUTION INTRAVENOUS at 05:24

## 2022-01-01 RX ADMIN — HEPARIN SODIUM 5000 UNIT(S): 5000 INJECTION INTRAVENOUS; SUBCUTANEOUS at 05:19

## 2022-01-01 RX ADMIN — HYDROMORPHONE HYDROCHLORIDE 0.5 MILLIGRAM(S): 2 INJECTION INTRAMUSCULAR; INTRAVENOUS; SUBCUTANEOUS at 05:43

## 2022-01-01 RX ADMIN — SCOPALAMINE 1 PATCH: 1 PATCH, EXTENDED RELEASE TRANSDERMAL at 05:44

## 2022-01-01 RX ADMIN — HYDROMORPHONE HYDROCHLORIDE 0.25 MILLIGRAM(S): 2 INJECTION INTRAMUSCULAR; INTRAVENOUS; SUBCUTANEOUS at 23:16

## 2022-01-01 RX ADMIN — NYSTATIN CREAM 1 APPLICATION(S): 100000 CREAM TOPICAL at 05:52

## 2022-01-01 RX ADMIN — HYDROMORPHONE HYDROCHLORIDE 0.25 MILLIGRAM(S): 2 INJECTION INTRAMUSCULAR; INTRAVENOUS; SUBCUTANEOUS at 05:51

## 2022-01-01 RX ADMIN — HYDROMORPHONE HYDROCHLORIDE 0.25 MILLIGRAM(S): 2 INJECTION INTRAMUSCULAR; INTRAVENOUS; SUBCUTANEOUS at 12:26

## 2022-01-01 RX ADMIN — HYDROMORPHONE HYDROCHLORIDE 0.5 MILLIGRAM(S): 2 INJECTION INTRAMUSCULAR; INTRAVENOUS; SUBCUTANEOUS at 17:52

## 2022-01-01 RX ADMIN — HEPARIN SODIUM 5000 UNIT(S): 5000 INJECTION INTRAVENOUS; SUBCUTANEOUS at 06:15

## 2022-01-01 RX ADMIN — NYSTATIN CREAM 1 APPLICATION(S): 100000 CREAM TOPICAL at 17:38

## 2022-01-01 RX ADMIN — SODIUM CHLORIDE 500 MILLILITER(S): 9 INJECTION INTRAMUSCULAR; INTRAVENOUS; SUBCUTANEOUS at 05:35

## 2022-01-01 RX ADMIN — NYSTATIN CREAM 1 APPLICATION(S): 100000 CREAM TOPICAL at 18:17

## 2022-01-01 RX ADMIN — SODIUM CHLORIDE 100 MILLILITER(S): 9 INJECTION, SOLUTION INTRAVENOUS at 17:37

## 2022-01-01 RX ADMIN — SODIUM CHLORIDE 500 MILLILITER(S): 9 INJECTION INTRAMUSCULAR; INTRAVENOUS; SUBCUTANEOUS at 12:17

## 2022-01-01 RX ADMIN — NYSTATIN CREAM 1 APPLICATION(S): 100000 CREAM TOPICAL at 06:15

## 2022-01-01 RX ADMIN — HYDROMORPHONE HYDROCHLORIDE 0.25 MILLIGRAM(S): 2 INJECTION INTRAMUSCULAR; INTRAVENOUS; SUBCUTANEOUS at 18:30

## 2022-01-01 RX ADMIN — ALBUTEROL 2 PUFF(S): 90 AEROSOL, METERED ORAL at 03:21

## 2022-01-01 RX ADMIN — HEPARIN SODIUM 5000 UNIT(S): 5000 INJECTION INTRAVENOUS; SUBCUTANEOUS at 05:43

## 2022-01-01 RX ADMIN — Medication 6 MILLIGRAM(S): at 05:19

## 2022-01-01 RX ADMIN — HYDROMORPHONE HYDROCHLORIDE 0.25 MILLIGRAM(S): 2 INJECTION INTRAMUSCULAR; INTRAVENOUS; SUBCUTANEOUS at 17:37

## 2022-01-01 RX ADMIN — HYDROMORPHONE HYDROCHLORIDE 0.25 MILLIGRAM(S): 2 INJECTION INTRAMUSCULAR; INTRAVENOUS; SUBCUTANEOUS at 06:00

## 2022-01-01 RX ADMIN — HEPARIN SODIUM 5000 UNIT(S): 5000 INJECTION INTRAVENOUS; SUBCUTANEOUS at 17:40

## 2022-01-01 RX ADMIN — HYDROMORPHONE HYDROCHLORIDE 0.25 MILLIGRAM(S): 2 INJECTION INTRAMUSCULAR; INTRAVENOUS; SUBCUTANEOUS at 07:23

## 2022-01-01 RX ADMIN — HEPARIN SODIUM 5000 UNIT(S): 5000 INJECTION INTRAVENOUS; SUBCUTANEOUS at 05:52

## 2022-01-01 RX ADMIN — NYSTATIN CREAM 1 APPLICATION(S): 100000 CREAM TOPICAL at 05:44

## 2022-01-01 RX ADMIN — HYDROMORPHONE HYDROCHLORIDE 0.25 MILLIGRAM(S): 2 INJECTION INTRAMUSCULAR; INTRAVENOUS; SUBCUTANEOUS at 18:15

## 2022-01-01 RX ADMIN — HYDROMORPHONE HYDROCHLORIDE 0.25 MILLIGRAM(S): 2 INJECTION INTRAMUSCULAR; INTRAVENOUS; SUBCUTANEOUS at 11:47

## 2022-01-01 RX ADMIN — HYDROMORPHONE HYDROCHLORIDE 0.25 MILLIGRAM(S): 2 INJECTION INTRAMUSCULAR; INTRAVENOUS; SUBCUTANEOUS at 12:36

## 2022-01-01 RX ADMIN — REMDESIVIR 500 MILLIGRAM(S): 5 INJECTION INTRAVENOUS at 17:37

## 2022-01-01 RX ADMIN — Medication 6 MILLIGRAM(S): at 05:43

## 2022-01-01 RX ADMIN — SODIUM CHLORIDE 100 MILLILITER(S): 9 INJECTION INTRAMUSCULAR; INTRAVENOUS; SUBCUTANEOUS at 15:16

## 2022-01-01 RX ADMIN — HEPARIN SODIUM 5000 UNIT(S): 5000 INJECTION INTRAVENOUS; SUBCUTANEOUS at 17:53

## 2022-01-01 RX ADMIN — HYDROMORPHONE HYDROCHLORIDE 0.25 MILLIGRAM(S): 2 INJECTION INTRAMUSCULAR; INTRAVENOUS; SUBCUTANEOUS at 00:00

## 2022-01-01 NOTE — PROGRESS NOTE ADULT - SUBJECTIVE AND OBJECTIVE BOX
[facility] NEPHROLOGY SERVICES, [facility]  NEPHROLOGY AND HYPERTENSION  [address]  [address]  [place] [address]  [phone]    MD JOANIE ESPINAL, MD NAEEM VAZQUEZ, MD ELIZA PURVIS, MD BROCK VERNON, MD ROMELIA MCFADDEN, MD DIONICIO JEFFERS MD          Patient events noted  No distress   on high flow    MEDICATIONS  (STANDING):  dexAMETHasone  Injectable 6 milliGRAM(s) IV Push daily  dextrose 5%. 1000 milliLiter(s) (100 mL/Hr) IV Continuous <Continuous>  heparin   Injectable 5000 Unit(s) SubCutaneous every 12 hours  HYDROmorphone  Injectable 0.25 milliGRAM(s) IV Push every 6 hours  nystatin Powder 1 Application(s) Topical two times a day  remdesivir  IVPB 100 milliGRAM(s) IV Intermittent every 24 hours  remdesivir  IVPB   IV Intermittent     MEDICATIONS  (PRN):  acetaminophen     Tablet .. 650 milliGRAM(s) Oral every 6 hours PRN Temp greater or equal to 38C (100.4F), Moderate Pain (4 - 6)  ALBUTerol    90 MICROgram(s) HFA Inhaler 2 Puff(s) Inhalation every 6 hours PRN Wheezing      12-31-21 @ 07:01  -  01-01-22 @ 07:00  --------------------------------------------------------  IN: 120 mL / OUT: 0 mL / NET: 120 mL    01-01-22 @ 07:01  -  01-01-22 @ 21:01  --------------------------------------------------------  IN: 50 mL / OUT: 0 mL / NET: 50 mL      PHYSICAL EXAM:      T(C): 36.3 (01-01-22 @ 16:26), Max: 36.4 (01-01-22 @ 05:13)  HR: 84 (01-01-22 @ 17:27) (73 - 89)  BP: 109/77 (01-01-22 @ 16:26) (109/77 - 132/77)  RR: 20 (01-01-22 @ 17:27) (18 - 21)  SpO2: 90% (01-01-22 @ 17:27) (89% - 94%)  Wt(kg): --  Lungs decrease BS bases  Heart S1S2  Abd soft NT ND  Extremities:   1 edema                                    11.4   14.04 )-----------( 101      ( 01 Jan 2022 08:30 )             36.3     01-01    144  |  109<H>  |  127<H>  ----------------------------<  140<H>  4.8   |  27  |  3.91<H>    Ca    8.4<L>      01 Jan 2022 08:30    TPro  6.2  /  Alb  2.0<L>  /  TBili  0.7  /  DBili  0.4<H>  /  AST  41<H>  /  ALT  18  /  AlkPhos  70  01-01      LIVER FUNCTIONS - ( 01 Jan 2022 08:30 )  Alb: 2.0 g/dL / Pro: 6.2 gm/dL / ALK PHOS: 70 U/L / ALT: 18 U/L / AST: 41 U/L / GGT: x           Creatinine Trend: 3.91<--, 4.15<--, 4.56<--, 4.57<--, 4.81<--, 5.02<--    KERI/CKD in setting of COVID   Stabilizing;      Plan:  Low K diet  Avoid nephrotoxins  F/u BMP, UO  Prognosis is poor overall w/or w/o RRT  Favor comfort care        Harman Escobar MD

## 2022-01-01 NOTE — PROGRESS NOTE ADULT - SUBJECTIVE AND OBJECTIVE BOX
Patient is a 89y old  Female who presents with a chief complaint of covid (31 Dec 2021 23:27)      OVERNIGHT EVENTS:    REVIEW OF SYSTEMS: denies chest pain/SOB, diaphoresis, no F/C, cough, dizziness, headache, blurry vision, nausea, vomiting, abdominal pain. All others review of systems negative     MEDICATIONS  (STANDING):  dexAMETHasone  Injectable 6 milliGRAM(s) IV Push daily  dextrose 5%. 1000 milliLiter(s) (100 mL/Hr) IV Continuous <Continuous>  heparin   Injectable 5000 Unit(s) SubCutaneous every 12 hours  HYDROmorphone  Injectable 0.25 milliGRAM(s) IV Push every 6 hours  nystatin Powder 1 Application(s) Topical two times a day  remdesivir  IVPB 100 milliGRAM(s) IV Intermittent every 24 hours  remdesivir  IVPB   IV Intermittent     MEDICATIONS  (PRN):  acetaminophen     Tablet .. 650 milliGRAM(s) Oral every 6 hours PRN Temp greater or equal to 38C (100.4F), Moderate Pain (4 - 6)  ALBUTerol    90 MICROgram(s) HFA Inhaler 2 Puff(s) Inhalation every 6 hours PRN Wheezing      Allergies    No Known Allergies    Intolerances        T(F): 97.2 (01-01-22 @ 11:53), Max: 97.5 (01-01-22 @ 05:13)  HR: 89 (01-01-22 @ 11:53) (73 - 99)  BP: 123/71 (01-01-22 @ 11:53) (123/65 - 143/84)  RR: 20 (01-01-22 @ 11:53) (18 - 21)  SpO2: 94% (01-01-22 @ 11:53) (90% - 94%)  Wt(kg): --    PHYSICAL EXAM:  GENERAL: NAD  HEAD:  Atraumatic, Normocephalic  EYES: PERRLA, conjunctiva and sclera clear  ENMT: Moist mucous membranes  NECK: Supple, No JVD, Normal thyroid  NERVOUS SYSTEM:  Alert & Awake  CHEST/LUNG: Clear to percussion bilaterally;   HEART: Regular rate and rhythm;   ABDOMEN: Soft, Nontender, Nondistended; Bowel sounds present  EXTREMITIES:  no edema BL LE  SKIN: moist    LABS:                        11.4   14.04 )-----------( 101      ( 01 Jan 2022 08:30 )             36.3     01-01    144  |  109<H>  |  127<H>  ----------------------------<  140<H>  4.8   |  27  |  3.91<H>    Ca    8.4<L>      01 Jan 2022 08:30    TPro  6.2  /  Alb  2.0<L>  /  TBili  0.7  /  DBili  0.4<H>  /  AST  41<H>  /  ALT  18  /  AlkPhos  70  01-01        Cultures;   CAPILLARY BLOOD GLUCOSE        Lipid panel:           RADIOLOGY & ADDITIONAL TESTS:    Imaging Personally Reviewed:  [x ] YES      Consultant(s) Notes Reviewed:  [x ] YES     Care Discussed with [x ] Consultants [X ] Patient [ ] Family  [x ]    [x ]  Other; RN

## 2022-01-02 NOTE — PROGRESS NOTE ADULT - SUBJECTIVE AND OBJECTIVE BOX
Patient is a 89y old  Female who presents with a chief complaint of covid (01 Jan 2022 21:01)      OVERNIGHT EVENTS:    REVIEW OF SYSTEMS: denies chest pain/SOB, diaphoresis, no F/C, cough, dizziness, headache, blurry vision, nausea, vomiting, abdominal pain. All others review of systems negative     MEDICATIONS  (STANDING):  dexAMETHasone  Injectable 6 milliGRAM(s) IV Push daily  dextrose 5%. 1000 milliLiter(s) (100 mL/Hr) IV Continuous <Continuous>  heparin   Injectable 5000 Unit(s) SubCutaneous every 12 hours  HYDROmorphone  Injectable 0.25 milliGRAM(s) IV Push every 6 hours  nystatin Powder 1 Application(s) Topical two times a day  remdesivir  IVPB 100 milliGRAM(s) IV Intermittent every 24 hours  remdesivir  IVPB   IV Intermittent     MEDICATIONS  (PRN):  acetaminophen     Tablet .. 650 milliGRAM(s) Oral every 6 hours PRN Temp greater or equal to 38C (100.4F), Moderate Pain (4 - 6)  ALBUTerol    90 MICROgram(s) HFA Inhaler 2 Puff(s) Inhalation every 6 hours PRN Wheezing      Allergies    No Known Allergies    Intolerances        T(F): 98.6 (01-02-22 @ 12:01), Max: 98.6 (01-02-22 @ 12:01)  HR: 92 (01-02-22 @ 12:01) (76 - 92)  BP: 107/71 (01-02-22 @ 12:01) (106/64 - 143/83)  RR: 20 (01-02-22 @ 12:01) (18 - 22)  SpO2: 92% (01-02-22 @ 12:01) (88% - 95%)  Wt(kg): --    PHYSICAL EXAM:  GENERAL: NAD  HEAD:  Atraumatic, Normocephalic  EYES: PERRLA, conjunctiva and sclera clear  ENMT: Moist mucous membranes  NECK: Supple, No JVD, Normal thyroid  NERVOUS SYSTEM:  Alert & Awake  CHEST/LUNG: Clear to percussion bilaterally;   HEART: Regular rate and rhythm;   ABDOMEN: Soft, Nontender, Nondistended; Bowel sounds present  EXTREMITIES:  no edema BL LE  SKIN: moist    LABS:                        11.0   19.52 )-----------( 108      ( 02 Jan 2022 07:22 )             34.2     01-02    142  |  107  |  132<H>  ----------------------------<  108<H>  4.5   |  24  |  4.07<H>    Ca    8.0<L>      02 Jan 2022 07:22    TPro  5.8<L>  /  Alb  1.8<L>  /  TBili  0.7  /  DBili  0.4<H>  /  AST  32  /  ALT  16  /  AlkPhos  71  01-02        Cultures;   CAPILLARY BLOOD GLUCOSE        Lipid panel:           RADIOLOGY & ADDITIONAL TESTS:    Imaging Personally Reviewed:  [x ] YES      Consultant(s) Notes Reviewed:  [x ] YES     Care Discussed with [x ] Consultants [X ] Patient [ ] Family  [x ]    [x ]  Other; RN

## 2022-01-02 NOTE — PROGRESS NOTE ADULT - SUBJECTIVE AND OBJECTIVE BOX
[facility] NEPHROLOGY SERVICES, [facility]  NEPHROLOGY AND HYPERTENSION  [address]  [address]  [place] [address]  [phone]    MD JOANIE ESPINAL, MD ELIZA MICHAELS, MD BROCK VERNON, MD ROMELIA MCFADDEN, MD DIONICIO JEFFERS MD          Patient events noted  No distress    MEDICATIONS  (STANDING):  dexAMETHasone  Injectable 6 milliGRAM(s) IV Push daily  heparin   Injectable 5000 Unit(s) SubCutaneous every 12 hours  HYDROmorphone  Injectable 0.25 milliGRAM(s) IV Push every 6 hours  nystatin Powder 1 Application(s) Topical two times a day    MEDICATIONS  (PRN):  acetaminophen     Tablet .. 650 milliGRAM(s) Oral every 6 hours PRN Temp greater or equal to 38C (100.4F), Moderate Pain (4 - 6)  ALBUTerol    90 MICROgram(s) HFA Inhaler 2 Puff(s) Inhalation every 6 hours PRN Wheezing      01-01-22 @ 07:01  -  01-02-22 @ 07:00  --------------------------------------------------------  IN: 50 mL / OUT: 0 mL / NET: 50 mL      PHYSICAL EXAM:      T(C): 36.2 (01-02-22 @ 16:59), Max: 37 (01-02-22 @ 12:01)  HR: 101 (01-02-22 @ 21:30) (78 - 108)  BP: 104/54 (01-02-22 @ 16:59) (104/54 - 143/83)  RR: 20 (01-02-22 @ 21:30) (18 - 22)  SpO2: 90% (01-02-22 @ 21:30) (88% - 95%)  Wt(kg): --  Lungs clear decreased BS   Heart S1S2  Abd soft NT ND  Extremities:   tr edema                                    11.0   19.52 )-----------( 108      ( 02 Jan 2022 07:22 )             34.2     01-02    142  |  107  |  132<H>  ----------------------------<  108<H>  4.5   |  24  |  4.07<H>    Ca    8.0<L>      02 Jan 2022 07:22    TPro  5.8<L>  /  Alb  1.8<L>  /  TBili  0.7  /  DBili  0.4<H>  /  AST  32  /  ALT  16  /  AlkPhos  71  01-02      LIVER FUNCTIONS - ( 02 Jan 2022 07:22 )  Alb: 1.8 g/dL / Pro: 5.8 gm/dL / ALK PHOS: 71 U/L / ALT: 16 U/L / AST: 32 U/L / GGT: x           Creatinine Trend: 4.07<--, 3.91<--, 4.15<--, 4.56<--, 4.57<--, 4.81<--      Assessment   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

## 2022-01-03 NOTE — PROGRESS NOTE ADULT - TIME BILLING
min spent reviewing chart, examining patient, discussing plan with patient and family
evaluation of pt, review of records, collaboration with care teams
min spent reviewing chart, examining patient, discussing plan with patient and family
evaluation of pt, review of records, collaboration with care teams
min spent reviewing chart, examining patient, discussing plan with patient and family

## 2022-01-03 NOTE — PROGRESS NOTE ADULT - SUBJECTIVE AND OBJECTIVE BOX
Patient: GERMÁN ALBERTO [id number] 89y Female                            Hospitalist Attending Note    Pt remains on High flow O2.  Unable to tolerate po intake.     ____________________PHYSICAL EXAM:  GENERAL:  NAD, arousable, confused.   HEENT: NCAT  CARDIOVASCULAR:  S1, S2  LUNGS: Coarse BS b/l.   ABDOMEN:  soft, (-) tenderness, (-) distension, (+) bowel sounds, (-) guarding, (-) rebound (-) rigidity  EXTREMITIES:  no cyanosis / clubbing / edema.   ____________________     VITALS:  Vital Signs Last 24 Hrs  T(C): 36.3 (2022 17:03), Max: 36.5 (2022 23:37)  T(F): 97.4 (2022 17:03), Max: 97.7 (2022 23:37)  HR: 97 (2022 17:09) (80 - 108)  BP: 103/54 (2022 17:03) (90/58 - 103/63)  BP(mean): --  RR: 24 (2022 17:09) (12 - 24)  SpO2: 90% (2022 17:09) (90% - 96%) Daily     Daily Weight in k (2022 05:17)  CAPILLARY BLOOD GLUCOSE        I&O's Summary      HISTORY:  PAST MEDICAL & SURGICAL HISTORY:  HTN (hypertension)    Allergies    No Known Allergies    Intolerances       LABS:                        12.4   25.32 )-----------( 130      ( 2022 08:46 )             38.1     -    138  |  104  |  140<H>  ----------------------------<  92  4.8   |  22  |  4.03<H>    Ca    8.3<L>      2022 08:46  Phos  6.7     01-  Mg     1.9     -    TPro  6.4  /  Alb  2.0<L>  /  TBili  1.0  /  DBili  0.5<H>  /  AST  35  /  ALT  17  /  AlkPhos  101  01-03      LIVER FUNCTIONS - ( 2022 08:46 )  Alb: 2.0 g/dL / Pro: 6.4 gm/dL / ALK PHOS: 101 U/L / ALT: 17 U/L / AST: 35 U/L / GGT: x                     MEDICATIONS:  MEDICATIONS  (STANDING):  dexAMETHasone  Injectable 6 milliGRAM(s) IV Push daily  heparin   Injectable 5000 Unit(s) SubCutaneous every 12 hours  HYDROmorphone  Injectable 0.5 milliGRAM(s) IV Push every 6 hours  nystatin Powder 1 Application(s) Topical two times a day  scopolamine 1 mG/72 Hr(s) Patch 1 Patch Transdermal every 72 hours    MEDICATIONS  (PRN):  acetaminophen  Suppository .. 650 milliGRAM(s) Rectal every 6 hours PRN Temp greater or equal to 38C (100.4F), Mild Pain (1 - 3)  ALBUTerol    90 MICROgram(s) HFA Inhaler 2 Puff(s) Inhalation every 6 hours PRN Wheezing  bisacodyl Suppository 10 milliGRAM(s) Rectal daily PRN Constipation  HYDROmorphone  Injectable 1 milliGRAM(s) IV Push every 2 hours PRN severe distress or severe pain

## 2022-01-03 NOTE — PROGRESS NOTE ADULT - ASSESSMENT
89F with a PMH of HTN who presents to the ED for dyspnea, found to be COVID positive with KERI.  She has remained on high flow O2.    # COVID infection - monitor SaO2.  On Decadron.  Supplemental O2.  Monitor inflammatory markers - D-dimer, CRP, Ferritin.  Encourage proning, incentive spirometry, chest PT.   # Acute Hypoxic Respiratory Failure - O2 as above.  Wean as tolerated.    # Inpatient DVT Prophylaxis - Lovenox subcut   # KERI (acute kidney injury) - Cr 5.0.  Renal following.  # Essential HTN - Monitor BP.  Continue antihypertensives.  # Functional Quadriplegia - supportive care.   # Inpatient DVT prophylaxis - subcutaneous Heparin     Pt with poor prognosis with COVID, remains on high flow O2 with no improvement.  Cr remains elevated, not a good candidate for renal replacement therapy.  Discussed with palliative care.  Pt is appropriate for conservative measures and no further escalation of care.    Per palliative care, pt's residence is attempting to locate next of kin.  Will defer further advanced directives until it is clarified.

## 2022-01-03 NOTE — PROGRESS NOTE ADULT - SUBJECTIVE AND OBJECTIVE BOX
SUBJECTIVE AND OBJECTIVE: poorly responsive, clinically worsening, more dyspneic   INTERVAL HPI/OVERNIGHT EVENTS:    DNR on chart: no  Allergies    No Known Allergies    Intolerances    MEDICATIONS  (STANDING):  dexAMETHasone  Injectable 6 milliGRAM(s) IV Push daily  heparin   Injectable 5000 Unit(s) SubCutaneous every 12 hours  HYDROmorphone  Injectable 0.25 milliGRAM(s) IV Push every 6 hours  nystatin Powder 1 Application(s) Topical two times a day    MEDICATIONS  (PRN):  acetaminophen     Tablet .. 650 milliGRAM(s) Oral every 6 hours PRN Temp greater or equal to 38C (100.4F), Moderate Pain (4 - 6)  ALBUTerol    90 MICROgram(s) HFA Inhaler 2 Puff(s) Inhalation every 6 hours PRN Wheezing      ITEMS UNCHECKED ARE NOT PRESENT    PRESENT SYMPTOMS: [x ]Unable to obtain due to poor mentation   Source if other than patient:  [ ]Family   [ ]Team     Pain:  [ ]yes [ ]no  QOL impact -   Location -                    Aggravating factors -  Quality -  Radiation -  Timing-  Severity (0-10 scale):  Minimal acceptable level (0-10 scale):     Dyspnea:                           [ ]Mild [ ]Moderate [ ]Severe  Anxiety:                             [ ]Mild [ ]Moderate [ ]Severe  Fatigue:                             [ ]Mild [ ]Moderate [ ]Severe  Nausea:                             [ ]Mild [ ]Moderate [ ]Severe  Loss of appetite:              [ ]Mild [ ]Moderate [ ]Severe  Constipation:                    [ ]Mild [ ]Moderate [ ]Severe    CPOT:    https://www.[facility].org/getattachment/[email or web address]/Critical-Care-Pain-Observation-Tool-(CPOT)    PAIN AD Score:	7  http://geriatrictoolkit.[facility]/cog/painad.pdf (Ctrl + left click to view)    Other Symptoms:  [ ]All other review of systems negative     Palliative Performance Status Version 2:      10   %      http://npcrc.org/files/news/palliative_performance_scale_ppsv2.pdf  PHYSICAL EXAM:  Vital Signs Last 24 Hrs  T(C): 36.3 (03 Jan 2022 11:56), Max: 36.5 (02 Jan 2022 23:37)  T(F): 97.4 (03 Jan 2022 11:56), Max: 97.7 (02 Jan 2022 23:37)  HR: 80 (03 Jan 2022 13:04) (80 - 108)  BP: 103/63 (03 Jan 2022 11:56) (90/58 - 104/54)  BP(mean): --  RR: 20 (03 Jan 2022 13:04) (12 - 20)  SpO2: 91% (03 Jan 2022 13:04) (90% - 96%) I&O's Summary     GENERAL:  [ ]Alert  [ ]Oriented x   [ x]Lethargic  [ ]Cachexia  [ ]Unarousable  [ ]Verbal  [x ]Non-Verbal  Behavioral:   [ ]Anxiety  [x ]Delirium [ ]Agitation [ ]Other  HEENT:  [ ]Normal   [ ]Dry mouth   [ ]ET Tube/Trach  [ ]Oral lesions  PULMONARY:   [ ]Clear [x ]Tachypnea  [x]Audible excessive secretions   [ x]Rhonchi        [ ]Right [ ]Left [x ]Bilateral  [ ]Crackles        [ ]Right [ ]Left [ ]Bilateral  [ ]Wheezing     [ ]Right [ ]Left [ ]Bilateral  [ ]Diminished BS [ ] Right [ ]Left [ ]Bilateral  CARDIOVASCULAR:    [ x]Regular [ ]Irregular [ ]Tachy  [ ]Darrian [ ]Murmur [ ]Other  GASTROINTESTINAL:  [ x]Soft  [ ]Distended   [ ]+BS  [ x]Non tender [ ]Tender  [ ]PEG [ ]OGT/ NGT   Last BM:  1/2  GENITOURINARY:  [ ]Normal [ ]Incontinent   [ ]Oliguria/Anuria   [ ]Huang  MUSCULOSKELETAL:   [ ]Normal   [ ]Weakness  [x ]Bed/Wheelchair bound [ x]Edema  NEUROLOGIC:   [ ]No focal deficits  [x ] Cognitive impairment  [x ] Dysphagia [ ]Dysarthria [ ] Paresis [ ]Other   SKIN:   [ x]Normal  [ ]Rash   [ ]Pressure ulcer(s) [ ]y [ ]n present on admission    CRITICAL CARE:  [ ]Shock Present  [ ]Septic [ ]Cardiogenic [ ]Neurologic [ ]Hypovolemic  [ ]Vasopressors [ ]Inotropes  [ ]Respiratory failure present [ ]Mechanical Ventilation [ ]Non-invasive ventilatory support [ ]High-Flow   [ ]Acute  [ ]Chronic [ ]Hypoxic  [ ]Hypercarbic [ ]Other  [ ]Other organ failure     LABS:                        12.4   25.32 )-----------( 130      ( 03 Jan 2022 08:46 )             38.1   01-03    138  |  104  |  140<H>  ----------------------------<  92  4.8   |  22  |  4.03<H>    Ca    8.3<L>      03 Jan 2022 08:46  Phos  6.7     01-03  Mg     1.9     01-03    TPro  6.4  /  Alb  2.0<L>  /  TBili  1.0  /  DBili  0.5<H>  /  AST  35  /  ALT  17  /  AlkPhos  101  01-03        RADIOLOGY & ADDITIONAL STUDIES:    Protein Calorie Malnutrition Present: [ ]mild [ x]moderate [ ]severe [ ]underweight [ ]morbid obesity  https://www.andeal.org/vault/2440/web/files/ONC/Table_Clinical%20Characteristics%20to%20Document%20Malnutrition-White%20JV%20et%20al%202012.pdf    Height (cm): 167.6 (12-29-21 @ 10:33)  Weight (kg): 74.2 (12-29-21 @ 10:33)  BMI (kg/m2): 26.4 (12-29-21 @ 10:33)    [ x]PPSV2 < or = 30%  [ ]significant weight loss [x ]poor nutritional intake [x ]anasarca    [ ]Artificial Nutrition    REFERRALS:   [ ]Chaplaincy  [ ]Hospice  [ ]Child Life  [ ]Social Work  [ ]Case management [ ]Holistic Therapy     Goals of Care Document:

## 2022-01-03 NOTE — PROGRESS NOTE ADULT - SUBJECTIVE AND OBJECTIVE BOX
GERMÁN ALBERTO  MRN-[id number]      Follow Up:  COVID    Interval History: patient seen and examined, on HFNC and NRB, less responsive today    ROS:    [x ] Unobtainable because: poor mental status   [] All other systems negative except as noted    Constitutional: no fever, no chills  Head: no trauma  Eyes: no vision changes, no eye pain  ENT:  no sore throat, no rhinorrhea  Cardiovascular:  no chest pain, no palpitation  Respiratory:  no SOB, no cough  GI:  no abd pain, no vomiting, no diarrhea  urinary: no dysuria, no hematuria, no flank pain  musculoskeletal:  no joint pain, no joint swelling  skin:  no rash  neurology:  no headache, no seizure, no change in mental status  psych: no anxiety, no depression         Allergies  No Known Allergies        ANTIMICROBIALS:        MEDICATIONS  (STANDING):  dexAMETHasone  Injectable 6 daily  heparin   Injectable 5000 every 12 hours  HYDROmorphone  Injectable 0.5 every 6 hours  scopolamine 1 mG/72 Hr(s) Patch 1 every 72 hours      PRN  acetaminophen  Suppository .. 650 milliGRAM(s) Rectal every 6 hours PRN  ALBUTerol    90 MICROgram(s) HFA Inhaler 2 Puff(s) Inhalation every 6 hours PRN  bisacodyl Suppository 10 milliGRAM(s) Rectal daily PRN  HYDROmorphone  Injectable 1 milliGRAM(s) IV Push every 2 hours PRN      Physical Exam:  Vital Signs Last 24 Hrs  T(F): 97.4 (01-03-22 @ 11:56), Max: 97.7 (01-02-22 @ 23:37)  HR: 80 (01-03-22 @ 13:04)  BP: 103/63 (01-03-22 @ 11:56)  RR: 20 (01-03-22 @ 13:04)  SpO2: 91% (01-03-22 @ 13:04) (90% - 96%)  Wt(kg): --      General:    NAD,  non toxic, on HFNC and NRB   Head: atraumatic, normocephalic  Eye: normal sclera and conjunctiva  ENT:    no oral lesions, neck supple, poor dentition   Cardio:     regular S1, S2,  no murmur  Respiratory:    +rales  b/l,    no wheezing  abd:     soft,   BS +,   no tenderness  :   no CVAT,  no suprapubic tenderness  Musculoskeletal:   no joint swelling,   no edema, +hand deformity with contracted hands   vascular: no central lines, +PIV   Skin:    no rash  Neurologic:     poor mental status, didnt answer questions   psych: unable     WBC Count: 25.32 K/uL (01-03 @ 08:46)  WBC Count: 19.52 K/uL (01-02 @ 07:22)  WBC Count: 14.04 K/uL (01-01 @ 08:30)  WBC Count: 7.39 K/uL (12-31 @ 06:55)  WBC Count: 7.74 K/uL (12-28 @ 18:26)                            12.4   25.32 )-----------( 130      ( 03 Jan 2022 08:46 )             38.1       01-03    138  |  104  |  140<H>  ----------------------------<  92  4.8   |  22  |  4.03<H>    Ca    8.3<L>      03 Jan 2022 08:46  Phos  6.7     01-03  Mg     1.9     01-03    TPro  6.4  /  Alb  2.0<L>  /  TBili  1.0  /  DBili  0.5<H>  /  AST  35  /  ALT  17  /  AlkPhos  101  01-03      Creatinine Trend: 4.03<--, 4.07<--, 3.91<--, 4.15<--, 4.56<--, 4.57<--          MICROBIOLOGY:  v  .Blood Blood-Peripheral  12-29-21   No growth to date.  --  --      C-Reactive Protein, Serum: 44 (12-30)  C-Reactive Protein, Serum: 60 (12-29)    Ferritin, Serum: 1286 (12-30)  Ferritin, Serum: 1527 (12-29)      D-Dimer Assay, Quantitative: 384 (12-30)  D-Dimer Assay, Quantitative: 365 (12-28)    Procalcitonin, Serum: 0.56 (12-29-21 @ 00:54)        RADIOLOGY:

## 2022-01-03 NOTE — PROGRESS NOTE ADULT - ASSESSMENT
Patient is a 89F with a PMH of HTN who presents to the ED for dyspnea.  Patient currently AAOx2, able to provide limited history.  Patient admitted  for COVID19.  Has had symptoms for 7 days.  Symptoms include fever, chills, cough, generalized weakness and increased dyspnea.  Cough has been nonproductive.  Patient states that she has not received a COVID vaccine to admitted but told this writer that she received 2 doses of the vaccine where an agency came into her home to give her the shot,   Nonsmoker, NKDA.  Current SpO2 96% on 50L O2 via high flow NC.    Labs show elevated creatinine and elevated troponin.    CXR (I personally reviewed) bilateral infiltrates   covid positive with KERI   Discussed with renal who is in agreement to start RDV and monitor renal issues    Remdesivir has not been shown to impact mortality. Thus far it has been shown to accomplish is decrease the length of hospitalization in patients with severe disease. In patient with moderate disease data showed clinical improvement in patient treated with 5 days of remdesivir, but not those treated for 10 days.    Criteria for use include:  • SpO2 < 94% on room air, OR requiring supplemental oxygen, OR requiring invasive mechanical ventilation, OR requiring ECMO (e.g moderate to critical disease)  • eGFR > 30 mL/min  • ALT < 5X ULN    Contraindications  • Use during pregnancy unless the potential benefits justify the potential risk for the mother and the fetus.  • Remdesivir should not be initiated in patients with ALT greater than or equal to 5 times the upper limit of normal (ULN) of baseline.  • Use in patients with renal impairment is based on potential risk/benefit considerations.  Remdesivir Dosing  • Adult patients greater than or equal to 40 k mG IV x 1 dose on day 1, followed by 100 mG IV q24h.  • Administration of Remdesivir in patients with eGFR less than 30 mL/min should be considered if the potential benefits outweigh the potential risks. There is a potential accumulation of cyclodextrin excipient found in Remdesivir.    : still on HFNC, creatinine remains high, palliative care called and need to find surrogate decision maker, will continue RDV and decadron   1/3/22: patient with worsening mental status and oxygen requirement, issues with advanced directives as there is no next of kin, wbc  climbing, perhaps in the setting of dexamethasone, would hold off on use of antibiotics at this time, kidney function improved while on RDV which she has since completed though her severe illness is getting worse. Palliative care is following     COVID  ARF  KERI  Hypertension  Leukocytosis     Plan:   #COVID  Monitor clinically.  Monitor Oxygenation  O2 supplementation.  Remdesivir - completed   Monitor CBC, CMP q48hrs   Ferritin, CRP, and D dimer q 48 hrs.  IV Dexamethasone 6mg q24hrs x10 days until 1/6  Anticoagulation per protocol-heparin  Monitor for any bacterial superinfection/complications.    #Acute Respiratory Failure  wean oxygen as tolerated  chest PT  routine PT  decadron 6mg q24hrs for 10 days  Inhalers-MDI and avoid nebulizers except in negative pressure room     #KERI  renal onboard   IV hydration  avoid nephrotoxic agents  monitor ins and outs if feasible     #HTN  management per medicine  avoid extremes       D/W Dr. Javier Prasad,   Infectious Disease Attending  Pager [phone]  After 5pm/weekends please call [phone] for all inquiries and new consults

## 2022-01-04 NOTE — PROGRESS NOTE ADULT - SUBJECTIVE AND OBJECTIVE BOX
SUBJECTIVE AND OBJECTIVE: Patient on HFNC and NRB mask.   INTERVAL HPI/OVERNIGHT EVENTS:    DNR on chart: no  Allergies    No Known Allergies    Intolerances    MEDICATIONS  (STANDING):  dexAMETHasone  Injectable 6 milliGRAM(s) IV Push daily  heparin   Injectable 5000 Unit(s) SubCutaneous every 12 hours  HYDROmorphone  Injectable 0.5 milliGRAM(s) IV Push every 6 hours  nystatin Powder 1 Application(s) Topical two times a day  scopolamine 1 mG/72 Hr(s) Patch 1 Patch Transdermal every 72 hours  sodium chloride 0.9%. 1000 milliLiter(s) (100 mL/Hr) IV Continuous <Continuous>    MEDICATIONS  (PRN):  acetaminophen  Suppository .. 650 milliGRAM(s) Rectal every 6 hours PRN Temp greater or equal to 38C (100.4F), Mild Pain (1 - 3)  ALBUTerol    90 MICROgram(s) HFA Inhaler 2 Puff(s) Inhalation every 6 hours PRN Wheezing  bisacodyl Suppository 10 milliGRAM(s) Rectal daily PRN Constipation  HYDROmorphone  Injectable 1 milliGRAM(s) IV Push every 2 hours PRN severe distress or severe pain      ITEMS UNCHECKED ARE NOT PRESENT    PRESENT SYMPTOMS: [x ]Unable to obtain due to poor mentation   Source if other than patient:  [ ]Family   [ ]Team     Pain:  [ ]yes [ ]no  QOL impact -   Location -                    Aggravating factors -  Quality -  Radiation -  Timing-  Severity (0-10 scale):  Minimal acceptable level (0-10 scale):     Dyspnea:                           [ ]Mild [ ]Moderate [ ]Severe  Anxiety:                             [ ]Mild [ ]Moderate [ ]Severe  Fatigue:                             [ ]Mild [ ]Moderate [ ]Severe  Nausea:                             [ ]Mild [ ]Moderate [ ]Severe  Loss of appetite:              [ ]Mild [ ]Moderate [ ]Severe  Constipation:                    [ ]Mild [ ]Moderate [ ]Severe    PAIN AD Score:	2  http://geriatrictoolkit.[place].[email or web address]/cog/painad.pdf (Ctrl + left click to view)    Other Symptoms:  [ ]All other review of systems negative     Palliative Performance Status Version 2:       10  %      http://npcrc.org/files/news/palliative_performance_scale_ppsv2.pdf  PHYSICAL EXAM:  Vital Signs Last 24 Hrs  T(C): 36.8 (04 Jan 2022 11:00), Max: 36.8 (04 Jan 2022 11:00)  T(F): 98.2 (04 Jan 2022 11:00), Max: 98.2 (04 Jan 2022 11:00)  HR: 96 (04 Jan 2022 11:56) (80 - 99)  BP: 85/77 (04 Jan 2022 11:00) (75/47 - 103/54)  BP(mean): --  RR: 20 (04 Jan 2022 11:56) (18 - 24)  SpO2: 88% (04 Jan 2022 11:56) (88% - 100%) I&O's Summary     GENERAL:  [ ]Alert  [ ]Oriented x   [ ]Lethargic  [ ]Cachexia  [ ]Unarousable  [ ]Verbal  [x ]Non-Verbal  Behavioral:   [ ]Anxiety  [ ]Delirium [ ]Agitation [ ]Other  HEENT:  [ ]Normal   [x ]Dry mouth   [ ]ET Tube/Trach  [ ]Oral lesions  PULMONARY:   [ ]Clear [x ]Tachypnea  [ ]Audible excessive secretions   [ ]Rhonchi        [ ]Right [ ]Left [ ]Bilateral  [ ]Crackles        [ ]Right [ ]Left [ ]Bilateral  [ ]Wheezing     [ ]Right [ ]Left [ ]Bilateral  [ ]Diminished BS [ ] Right [ ]Left [ ]Bilateral  CARDIOVASCULAR:    [x ]Regular [ ]Irregular [ ]Tachy  [ ]Darrian [ ]Murmur [ ]Other  GASTROINTESTINAL:  x[ ]Soft  [ ]Distended   [ ]+BS  [ ]Non tender [ ]Tender  [ ]PEG [ ]OGT/ NGT   Last BM:  12/31/21  GENITOURINARY:  [ ]Normal [x ]Incontinent   [ ]Oliguria/Anuria   [ ]Huang  MUSCULOSKELETAL:   [ ]Normal   [ ]Weakness  [x ]Bed/Wheelchair bound [ ]Edema  NEUROLOGIC:   [ ]No focal deficits  [x ] Cognitive impairment  [ ] Dysphagia [ ]Dysarthria [ ] Paresis [ ]Other   SKIN:   [x ]Normal  [ ]Rash   [ ]Pressure ulcer(s) [ ]y [ ]n present on admission  extremities cool    CRITICAL CARE:  [x ]Shock Present  [x ]Septic [ ]Cardiogenic [ ]Neurologic [ ]Hypovolemic  [ ]Vasopressors [ ]Inotropes  [ x]Respiratory failure present [ ]Mechanical Ventilation [ ]Non-invasive ventilatory support [x ]High-Flow  [x ]Acute  [ ]Chronic [x ]Hypoxic  [ ]Hypercarbic [ ]Other  [x ]Other organ failure     LABS:                        11.2   21.37 )-----------( 139      ( 04 Jan 2022 07:04 )             35.1   01-04    136  |  102  |  157  ----------------------------<  91  5.1   |  22  |  4.58<H>    Ca    8.2<L>      04 Jan 2022 07:04  Phos  6.7     01-03  Mg     1.9     01-03    TPro  5.8<L>  /  Alb  1.6<L>  /  TBili  1.0  /  DBili  0.5<H>  /  AST  32  /  ALT  14  /  AlkPhos  97  01-04      RADIOLOGY & ADDITIONAL STUDIES: none new    Protein Calorie Malnutrition Present: [ ]mild [ ]moderate [ x]severe [ ]underweight [ ]morbid obesity  https://www.andeal.org/vault/2440/web/files/ONC/Table_Clinical%20Characteristics%20to%20Document%20Malnutrition-White%20JV%20et%20al%202012.pdf    Height (cm): 167.6 (12-29-21 @ 10:33)  Weight (kg): 74.2 (12-29-21 @ 10:33)  BMI (kg/m2): 26.4 (12-29-21 @ 10:33)    [x ]PPSV2 < or = 30%  [ ]significant weight loss [ x]poor nutritional intake [ ]anasarca    [ ]Artificial Nutrition    REFERRALS:   [ ]Chaplaincy  [ ]Hospice  [ ]Child Life  [ ]Social Work  [ ]Case management [ ]Holistic Therapy     Goals of Care Document:     SUBJECTIVE AND OBJECTIVE: Patient on HFNC and NRB mask.   INTERVAL HPI/OVERNIGHT EVENTS:    DNR on chart: no  Allergies    No Known Allergies    Intolerances    MEDICATIONS  (STANDING):  dexAMETHasone  Injectable 6 milliGRAM(s) IV Push daily  heparin   Injectable 5000 Unit(s) SubCutaneous every 12 hours  HYDROmorphone  Injectable 0.5 milliGRAM(s) IV Push every 6 hours  nystatin Powder 1 Application(s) Topical two times a day  scopolamine 1 mG/72 Hr(s) Patch 1 Patch Transdermal every 72 hours  sodium chloride 0.9%. 1000 milliLiter(s) (100 mL/Hr) IV Continuous <Continuous>    MEDICATIONS  (PRN):  acetaminophen  Suppository .. 650 milliGRAM(s) Rectal every 6 hours PRN Temp greater or equal to 38C (100.4F), Mild Pain (1 - 3)  ALBUTerol    90 MICROgram(s) HFA Inhaler 2 Puff(s) Inhalation every 6 hours PRN Wheezing  bisacodyl Suppository 10 milliGRAM(s) Rectal daily PRN Constipation  HYDROmorphone  Injectable 1 milliGRAM(s) IV Push every 2 hours PRN severe distress or severe pain      ITEMS UNCHECKED ARE NOT PRESENT    PRESENT SYMPTOMS: [x ]Unable to obtain due to poor mentation   Source if other than patient:  [ ]Family   [ ]Team     Pain:  [ ]yes [ ]no  QOL impact -   Location -                    Aggravating factors -  Quality -  Radiation -  Timing-  Severity (0-10 scale):  Minimal acceptable level (0-10 scale):     Dyspnea:                           [ ]Mild [ ]Moderate [ ]Severe  Anxiety:                             [ ]Mild [ ]Moderate [ ]Severe  Fatigue:                             [ ]Mild [ ]Moderate [ ]Severe  Nausea:                             [ ]Mild [ ]Moderate [ ]Severe  Loss of appetite:              [ ]Mild [ ]Moderate [ ]Severe  Constipation:                    [ ]Mild [ ]Moderate [ ]Severe    PAIN AD Score:	2  http://geriatrictoolkit.[place].[facility]/cog/painad.pdf (Ctrl + left click to view)    Other Symptoms:  [ ]All other review of systems negative     Palliative Performance Status Version 2:       10  %      http://npcrc.org/files/news/palliative_performance_scale_ppsv2.pdf  PHYSICAL EXAM:  Vital Signs Last 24 Hrs  T(C): 36.8 (04 Jan 2022 11:00), Max: 36.8 (04 Jan 2022 11:00)  T(F): 98.2 (04 Jan 2022 11:00), Max: 98.2 (04 Jan 2022 11:00)  HR: 96 (04 Jan 2022 11:56) (80 - 99)  BP: 85/77 (04 Jan 2022 11:00) (75/47 - 103/54)  BP(mean): --  RR: 20 (04 Jan 2022 11:56) (18 - 24)  SpO2: 88% (04 Jan 2022 11:56) (88% - 100%) I&O's Summary     GENERAL:  [ ]Alert  [ ]Oriented x   [ ]Lethargic  [ ]Cachexia  [x ]Unarousable  [ ]Verbal  [x ]Non-Verbal  Behavioral:   [ ]Anxiety  [ ]Delirium [ ]Agitation [ ]Other  HEENT:  [ ]Normal   [x ]Dry mouth   [ ]ET Tube/Trach  [ ]Oral lesions  PULMONARY:   [ ]Clear [x ]Tachypnea  [ ]Audible excessive secretions   [ ]Rhonchi        [ ]Right [ ]Left [ ]Bilateral  [ ]Crackles        [ ]Right [ ]Left [ ]Bilateral  [ ]Wheezing     [ ]Right [ ]Left [ ]Bilateral  [ ]Diminished BS [ ] Right [ ]Left [ ]Bilateral  CARDIOVASCULAR:    [x ]Regular [ ]Irregular [ ]Tachy  [ ]Darrian [ ]Murmur [ ]Other  GASTROINTESTINAL:  [x ]Soft  [ ]Distended   [ ]+BS  [ ]Non tender [ ]Tender  [ ]PEG [ ]OGT/ NGT   Last BM:  12/31/21  GENITOURINARY:  [ ]Normal [x ]Incontinent   [ ]Oliguria/Anuria   [ ]Huang  MUSCULOSKELETAL:   [ ]Normal   [ ]Weakness  [x ]Bed/Wheelchair bound [ ]Edema  NEUROLOGIC:   [ ]No focal deficits  [x ] Cognitive impairment  [ ] Dysphagia [ ]Dysarthria [ ] Paresis [ ]Other   SKIN:   [x ]Normal  [ ]Rash   [ ]Pressure ulcer(s) [ ]y [ ]n present on admission  extremities cool    CRITICAL CARE:  [x ]Shock Present  [x ]Septic [ ]Cardiogenic [ ]Neurologic [ ]Hypovolemic  [ ]Vasopressors [ ]Inotropes  [ x]Respiratory failure present [ ]Mechanical Ventilation [ ]Non-invasive ventilatory support [x ]High-Flow  [x ]Acute  [ ]Chronic [x ]Hypoxic  [ ]Hypercarbic [ ]Other  [x ]Other organ failure     LABS:                        11.2   21.37 )-----------( 139      ( 04 Jan 2022 07:04 )             35.1   01-04    136  |  102  |  157  ----------------------------<  91  5.1   |  22  |  4.58<H>    Ca    8.2<L>      04 Jan 2022 07:04  Phos  6.7     01-03  Mg     1.9     01-03    TPro  5.8<L>  /  Alb  1.6<L>  /  TBili  1.0  /  DBili  0.5<H>  /  AST  32  /  ALT  14  /  AlkPhos  97  01-04      RADIOLOGY & ADDITIONAL STUDIES: none new    Protein Calorie Malnutrition Present: [ ]mild [ ]moderate [ x]severe [ ]underweight [ ]morbid obesity  https://www.andeal.org/vault/2440/web/files/ONC/Table_Clinical%20Characteristics%20to%20Document%20Malnutrition-White%20JV%20et%20al%202012.pdf    Height (cm): 167.6 (12-29-21 @ 10:33)  Weight (kg): 74.2 (12-29-21 @ 10:33)  BMI (kg/m2): 26.4 (12-29-21 @ 10:33)    [x ]PPSV2 < or = 30%  [ ]significant weight loss [ x]poor nutritional intake [ ]anasarca    [ ]Artificial Nutrition    REFERRALS:   [ ]Chaplaincy  [ ]Hospice  [ ]Child Life  [ ]Social Work  [ ]Case management [ ]Holistic Therapy     Goals of Care Document:

## 2022-01-04 NOTE — DISCHARGE NOTE FOR THE EXPIRED PATIENT - HOSPITAL COURSE
89F with a PMH of HTN who presents to the ED for dyspnea, found to be COVID positive with KERI.  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  No known children / relatives / nor close friends.  Per Sarah, pt had had expressed to her that she would want "everything done" to get better, but has never had an explicit conversation regarding advanced directives.  At baseline, pt typically Alert and Oriented x 3.      Patient seen by ethics and Pallitive Care, all in agreement medical intervention is futile; therefore, resuscitation efforts were not performed.  Patient was pronounced dead at 21:08.

## 2022-01-04 NOTE — PROGRESS NOTE ADULT - PROBLEM SELECTOR PROBLEM 8
Preventive measure
Preventive measure
Essential hypertension
Encounter for palliative care
Preventive measure
Preventive measure
Encounter for palliative care

## 2022-01-04 NOTE — PROGRESS NOTE ADULT - REASON FOR ADMISSION
covid

## 2022-01-04 NOTE — PROGRESS NOTE ADULT - PROBLEM SELECTOR PLAN 6
Cr 5.0, unknown baseline  Will avoid hydration due to severe covid  Renal o/b  ivf
scds
reportedly has CELY / Caitlin Deal [phone]- only got busy signal  called [facility] establishment where pt lives and spoke with staff who had pt as inactive in their files since 2018.
Presently dependent for all care  was living in a [facility] prior to hospitalization
Cr 5.0, unknown baseline  Will avoid hydration due to severe covid  Renal o/b  ivf
dependent for care   lives in senior housing   pt able to tell me where she lives but identifies no family or emergency contacts, repeats that she lives in [place] in an apt and has "roommates".

## 2022-01-04 NOTE — PROGRESS NOTE ADULT - PROBLEM SELECTOR PLAN 2
due to COVID, acute illness  natural progression toward end of life.
as above
due to COVID, acute illness  natural progression toward end of life
due to COVID, acute illness  natural progression toward end of life.
as above
as above

## 2022-01-04 NOTE — PROGRESS NOTE ADULT - SUBJECTIVE AND OBJECTIVE BOX
GERMÁN ALBERTO  MRN-[id number]    Follow Up:  COVID 19, leukocytosis    Interval History: The pt was seen and examined, no change in pt's presentation, awake, not interactive, on HFNC with NRB over it. The pt is afebrile, hypotensive, WBC is better.     PAST MEDICAL & SURGICAL HISTORY:  HTN (hypertension)        ROS:    [ x] Unobtainable because: pt is not interactive, poor mental status  [ ] All other systems negative    Constitutional: no fever, no chills  Head: no trauma  Eyes: no vision changes, no eye pain  ENT:  no sore throat, no rhinorrhea  Cardiovascular:  no chest pain, no palpitation  Respiratory:  no SOB, no cough  GI:  no abd pain, no vomiting, no diarrhea  urinary: no dysuria, no hematuria, no flank pain  musculoskeletal:  no joint pain, no joint swelling  skin:  no rash  neurology:  no headache, no seizure, no change in mental status  psych: no anxiety, no depression         Allergies  No Known Allergies        ANTIMICROBIALS:      OTHER MEDS:  acetaminophen  Suppository .. 650 milliGRAM(s) Rectal every 6 hours PRN  ALBUTerol    90 MICROgram(s) HFA Inhaler 2 Puff(s) Inhalation every 6 hours PRN  bisacodyl Suppository 10 milliGRAM(s) Rectal daily PRN  dexAMETHasone  Injectable 6 milliGRAM(s) IV Push daily  heparin   Injectable 5000 Unit(s) SubCutaneous every 12 hours  HYDROmorphone  Injectable 0.5 milliGRAM(s) IV Push every 6 hours  HYDROmorphone  Injectable 1 milliGRAM(s) IV Push every 2 hours PRN  nystatin Powder 1 Application(s) Topical two times a day  scopolamine 1 mG/72 Hr(s) Patch 1 Patch Transdermal every 72 hours  sodium chloride 0.9%. 1000 milliLiter(s) IV Continuous <Continuous>      Vital Signs Last 24 Hrs  T(C): 36.8 (04 Jan 2022 11:00), Max: 36.8 (04 Jan 2022 11:00)  T(F): 98.2 (04 Jan 2022 11:00), Max: 98.2 (04 Jan 2022 11:00)  HR: 98 (04 Jan 2022 13:30) (80 - 99)  BP: 99/61 (04 Jan 2022 13:30) (75/47 - 103/54)  BP(mean): --  RR: 20 (04 Jan 2022 11:56) (18 - 24)  SpO2: 88% (04 Jan 2022 11:56) (88% - 100%)    Physical Exam:  General:    NAD,  non toxic, on HFNC and NRB   Head: atraumatic, normocephalic  Eye: normal sclera and conjunctiva  ENT:    no oral lesions, neck supple, poor dentition   Cardio:     regular S1, S2,  no murmur  Respiratory:    +rales  b/l,    no wheezing  abd:     soft,   BS +,   no tenderness  :   no CVAT,  no suprapubic tenderness  Musculoskeletal:   no joint swelling,   no edema, +hand deformity with contracted hands   vascular: no central lines, +PIV   Skin:    no rash  Neurologic:     poor mental status, didn't answer questions   psych: unable     WBC Count: 21.37 K/uL (01-04 @ 07:04)  WBC Count: 25.32 K/uL (01-03 @ 08:46)  WBC Count: 19.52 K/uL (01-02 @ 07:22)  WBC Count: 14.04 K/uL (01-01 @ 08:30)  WBC Count: 7.39 K/uL (12-31 @ 06:55)  WBC Count: 7.74 K/uL (12-28 @ 18:26)                            11.2   21.37 )-----------( 139      ( 04 Jan 2022 07:04 )             35.1       01-04    136  |  102  |  157  ----------------------------<  91  5.1   |  22  |  4.58<H>    Ca    8.2<L>      04 Jan 2022 07:04  Phos  6.7     01-03  Mg     1.9     01-03    TPro  5.8<L>  /  Alb  1.6<L>  /  TBili  1.0  /  DBili  0.5<H>  /  AST  32  /  ALT  14  /  AlkPhos  97  01-04          Creatinine Trend: 4.58<--, 4.03<--, 4.07<--, 3.91<--, 4.15<--, 4.56<--      MICROBIOLOGY:  v  .Blood Blood-Peripheral  12-29-21   No Growth Final  --  --      C-Reactive Protein, Serum: 44 (12-30)  C-Reactive Protein, Serum: 60 (12-29)    Ferritin, Serum: 942 (01-04)  Ferritin, Serum: 1165 (01-03)  Ferritin, Serum: 1286 (12-30)  Ferritin, Serum: 1527 (12-29)      D-Dimer Assay, Quantitative: 486 (01-04)  D-Dimer Assay, Quantitative: 516 (01-03)  D-Dimer Assay, Quantitative: 384 (12-30)  D-Dimer Assay, Quantitative: 365 (12-28)    Procalcitonin, Serum: 0.56 (12-29-21 @ 00:54)      RADIOLOGY:

## 2022-01-04 NOTE — PROGRESS NOTE ADULT - PROBLEM SELECTOR PROBLEM 5
Essential hypertension
Pneumonia due to COVID-19 virus
Troponin level elevated
Troponin level elevated
Pneumonia due to COVID-19 virus
Troponin level elevated
Pneumonia due to COVID-19 virus
Troponin level elevated

## 2022-01-04 NOTE — PROGRESS NOTE ADULT - ASSESSMENT
Patient is a 89F with a PMH of HTN who presents to the ED for dyspnea.  Patient currently AAOx2, able to provide limited history.  Patient admitted  for COVID19.  Has had symptoms for 7 days.  Symptoms include fever, chills, cough, generalized weakness and increased dyspnea.  Cough has been nonproductive.  Patient states that she has not received a COVID vaccine to admitted but told this writer that she received 2 doses of the vaccine where an agency came into her home to give her the shot,   Nonsmoker, NKDA.  Current SpO2 96% on 50L O2 via high flow NC.    Labs show elevated creatinine and elevated troponin.    CXR (I personally reviewed) bilateral infiltrates   covid positive with KERI   Discussed with renal who is in agreement to start RDV and monitor renal issues    Remdesivir has not been shown to impact mortality. Thus far it has been shown to accomplish is decrease the length of hospitalization in patients with severe disease. In patient with moderate disease data showed clinical improvement in patient treated with 5 days of remdesivir, but not those treated for 10 days.    Criteria for use include:  • SpO2 < 94% on room air, OR requiring supplemental oxygen, OR requiring invasive mechanical ventilation, OR requiring ECMO (e.g moderate to critical disease)  • eGFR > 30 mL/min  • ALT < 5X ULN    Contraindications  • Use during pregnancy unless the potential benefits justify the potential risk for the mother and the fetus.  • Remdesivir should not be initiated in patients with ALT greater than or equal to 5 times the upper limit of normal (ULN) of baseline.  • Use in patients with renal impairment is based on potential risk/benefit considerations.  Remdesivir Dosing  • Adult patients greater than or equal to 40 k mG IV x 1 dose on day 1, followed by 100 mG IV q24h.  • Administration of Remdesivir in patients with eGFR less than 30 mL/min should be considered if the potential benefits outweigh the potential risks. There is a potential accumulation of cyclodextrin excipient found in Remdesivir.    : still on HFNC, creatinine remains high, palliative care called and need to find surrogate decision maker, will continue RDV and decadron   1/3/22: patient with worsening mental status and oxygen requirement, issues with advanced directives as there is no next of kin, wbc  climbing, perhaps in the setting of dexamethasone, would hold off on use of antibiotics at this time, kidney function improved while on RDV which she has since completed though her severe illness is getting worse. Palliative care is following   : no fevers, remains on HFNC and NRB, WBC still high but better 21.37, Cr is elevated 4.58, LFTs ok, s/p remdesivir course, on decadron day #8.     COVID  ARF  KERI  Hypertension  Leukocytosis     Plan:   #COVID  Monitor clinically.  Monitor Oxygenation  O2 supplementation.  Remdesivir - completed   Monitor CBC, CMP q48hrs   Ferritin, CRP, and D dimer q 48 hrs.  IV Dexamethasone 6mg q24hrs x10 days until   Anticoagulation per protocol-heparin  Monitor for any bacterial superinfection/complications.    #Acute Respiratory Failure  wean oxygen as tolerated  chest PT  routine PT  decadron 6mg q24hrs for 10 days  Inhalers-MDI and avoid nebulizers except in negative pressure room     #KERI  renal onboard   IV hydration  avoid nephrotoxic agents  monitor ins and outs if feasible     #HTN  management per medicine  avoid extremes

## 2022-01-04 NOTE — PHARMACOTHERAPY INTERVENTION NOTE - COMMENTS
Recommended DVT prophylaxis initiation in COVID(+) patient.
Recommended discontinuation of dexamethasone 10mg IVP since this patient has an active order for COVID dosing of 6mg per day for 10 days, had already received a dose today, and per policy can push 6mg maximum.

## 2022-01-04 NOTE — PROGRESS NOTE ADULT - PROBLEM SELECTOR PROBLEM 6
Debility
KERI (acute kidney injury)
KERI (acute kidney injury)
Preventive measure
Debility
KERI (acute kidney injury)
Debility
KERI (acute kidney injury)

## 2022-01-04 NOTE — PROGRESS NOTE ADULT - PROBLEM SELECTOR PROBLEM 1
Acute respiratory failure due to COVID-19
Shortness of breath
Acute respiratory failure due to COVID-19

## 2022-01-04 NOTE — PROGRESS NOTE ADULT - PROBLEM SELECTOR PLAN 7
lisinopril
FAST 6E  dependent for care, no family or surrogates known or at least that patient can offer  pt lacks capacity to make her own medical decisions.
patient lacks decision making capacity  FAST 6E now with worsening mentation during hospitalization
lisinopril

## 2022-01-04 NOTE — RAPID RESPONSE TEAM SUMMARY - NSSITUATIONBACKGROUNDRRT_GEN_ALL_CORE
This is a 89 year old female admitted with COVID, ARF, KERI and elevated troponin. Pt with poor prognosis with advanced age, COVID PNA, respiratory failure and baseline dementia. Patient has completed course of Remdesivir and remains on decadron, and has been maintained on high flow O2 with no improvement. RRT called for O2 desaturation to high 70s/low 80s while on 45L HFNC, in addition to hypothermic to 96. Patient showing noticeable increased work of breathing, placed in Left Lateral Decubitus with chest PT performed. Patient will continue to be monitored for worsening respiratory failure and hypoxia, but likely is a poor candidate for escalating airway management as per palliative due to comorbidities and poor prognosis. Case discussed with Dr. Felton at bedside.

## 2022-01-04 NOTE — PROGRESS NOTE ADULT - ASSESSMENT
89F with a PMH of HTN who presents to the ED for dyspnea, found to be COVID positive with KERI.  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  No known children / relatives / nor close friends.  Per Sarah, pt had had expressed to her that she would want "everything done" to get better, but has never had an explicit conversation regarding advanced directives.  At baseline, pt typically Alert and Oriented x 3.      # COVID infection - monitor SaO2.  On Decadron.  Supplemental O2.  Monitor inflammatory markers - D-dimer, CRP, Ferritin.  Encourage proning, incentive spirometry, chest PT.   # Acute Hypoxic Respiratory Failure - O2 as above.  Has not been able to be weaned off high flow.   # Metabolic Encephalopathy - due to above.  Per pt's aide, pt at baseline is Alert and Oriented x 3.    # Hypotension - BP low, given IVF with improvement.   # KERI (acute kidney injury) - Cr 5.0 on admission.  Renal following.    # Essential HTN - Monitor BP.  Outpatient meds held due to hypotension.    # Functional Quadriplegia - supportive care.   # Inpatient DVT prophylaxis - subcutaneous Heparin     Pt with poor prognosis with COVID, remains on high flow O2 with no improvement.    Palliative care and ethics input appreciated, however, per my conversation with pt's HHA on , pt would likely have wanted to pursue medical measures with hopes of improvement, and is Alert and Oriented x 3 and functional at baseline.  Unfortunately, they did not have an explicit conversation regarding advanced directives and there are no known children / relatives / close friends available to further discuss her wishes.  As such, would pursue any further escalation of care, if medically indicated.    89F with a PMH of HTN who presents to the ED for dyspnea, found to be COVID positive with KERI.  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  No known children / relatives / nor close friends.  Per Sarah, pt had had expressed to her that she would want "everything done" to get better, but has never had an explicit conversation regarding advanced directives.  At baseline, pt typically Alert and Oriented x 3.      # COVID infection - monitor SaO2.  On Decadron.  Supplemental O2.  Monitor inflammatory markers - D-dimer, CRP, Ferritin.  Encourage proning, incentive spirometry, chest PT.   # Acute Hypoxic Respiratory Failure - O2 as above.  Has not been able to be weaned off high flow.   # Metabolic Encephalopathy - due to above.  Per pt's aide, pt at baseline is Alert and Oriented x 3.    # Hypotension - BP low, given IVF with improvement.   # KERI (acute kidney injury) - Cr 5.0 on admission.  Renal following.    # Essential HTN - Monitor BP.  Outpatient meds held due to hypotension.    # Functional Quadriplegia - supportive care.   # Inpatient DVT prophylaxis - subcutaneous Heparin     Pt with poor prognosis with COVID, remains on high flow O2 with no improvement.    Palliative care and ethics input appreciated, however, per my conversation with pt's HHA on , pt would likely have wanted to pursue medical measures with hopes of improvement, and is Alert and Oriented x 3 and functional at baseline.  Unfortunately, they did not have an explicit conversation regarding advanced directives and there are no known children / relatives / close friends available to further discuss her wishes.  As such, would pursue any further escalation of care, if medically indicated.  Plan of care discussed with medical leadership.

## 2022-01-04 NOTE — PROGRESS NOTE ADULT - PROBLEM SELECTOR PLAN 8
heparin sq
heparin sq
Pt remains with poor prognosis due to advanced age, severe sepsis from COVID PNA with respiratory failure, renal failure and baseline dementia. Pt would not benefit from advanced cardiac life support or mechanical ventilation as it would not change outcome and only produce suffering. Physicians are under no legal or ethical obligation to offer treatments that would 1) provide no medically indicated benefit or 2) impose unnecessary risk or burden to the patient. The benefits of each intervention offered including life sustaining measures must be weighed against the possible harm of each procedure or intervention when it no longer improves a patient’s prognosis, but introduces risk of suffering. Certain life-sustaining measures, such as chest compressions, vasopressors, hemodialysis, etc, may be considered non-beneficial by the health providers in light of this patient’s acute illness.    Discussed with Dr. Desir
heparin sq
Called pt's residence which is a [facility] in [place]- [phone], spoke with staff member Shobha who did not have recent files on pt - they were able to give me contact info for friends listed at that time a "Alice Anaya" [phone] which was disconnected number, also a "ARLETTE Tejada" [phone]- left a message on voicemail.   Shobha called me back to let me know pt was under APS [phone]. Will involve Social Work.   Dr. Desir updated.   Pt remains with poor prognosis due to advanced age, severe sepsis from COVID PNA with respiratory failure, renal failure and baseline dementia. Pt would not benefit from advanced cardiac life support or mechanical ventilation as it would not change outcome and only produce suffering. Physicians are under no legal or ethical obligation to offer treatments that would 1) provide no medically indicated benefit or 2) impose unnecessary risk or burden to the patient. The benefits of each intervention offered including life sustaining measures must be weighed against the possible harm of each procedure or intervention when it no longer improves a patient’s prognosis, but introduces risk of suffering. Certain life-sustaining measures, such as chest compressions, vasopressors, hemodialysis, etc, may be considered non-beneficial by the health providers in light of this patient’s acute illness.
heparin sq
BP low normal  ACEi d/c'd

## 2022-01-04 NOTE — PROGRESS NOTE ADULT - PROBLEM SELECTOR PROBLEM 2
Pneumonia due to COVID-19 virus
Pneumonia due to COVID-19 virus
Loss of appetite
Loss of appetite
Pneumonia due to COVID-19 virus
Pneumonia due to COVID-19 virus
Loss of appetite
Pneumonia due to COVID-19 virus

## 2022-01-04 NOTE — PROGRESS NOTE ADULT - PROBLEM SELECTOR PROBLEM 3
Acute hypernatremia
Acute hypernatremia
Troponin level elevated
Sepsis due to COVID-19
Acute hypernatremia
Sepsis due to COVID-19
Acute hypernatremia
Acute hypernatremia

## 2022-01-04 NOTE — PROGRESS NOTE ADULT - PROVIDER SPECIALTY LIST ADULT
Infectious Disease
Infectious Disease
Nephrology
Hospitalist
Hospitalist
Infectious Disease
Nephrology
Palliative Care
Hospitalist
Palliative Care
Hospitalist
Palliative Care
Hospitalist

## 2022-01-04 NOTE — PROGRESS NOTE ADULT - PROBLEM SELECTOR PLAN 3
ivf, trend bmp
ivf, trend bmp
pt with progression to multisystem failure in setting of COVID PNA  hemodynamics are worsening, BP dropping, oxygen requirements increasing
pt with progression to multisystem failure in setting of COVID PNA  hemodynamics are worsening, BP dropping, oxygen requirements increasing
due to poor po intake, renal failure   dehydration   on D5W IVF.
ivf, trend bmp
Troponin elevated.  Will trend  No current chest pain.    Likely related to covid infection, resp failure
ivf, trend bmp

## 2022-01-04 NOTE — PROGRESS NOTE ADULT - PROBLEM SELECTOR PLAN 5
Troponin elevated.  Will trend  No current chest pain.    Likely related to covid infection, resp failure
lisinopril
was given some treatment with remdesivir which was stopped in view of worsening renal function   continues on decadron
was given some treatment with remdesivir which was stopped in view of worsening renal function   continues on decadron
continues on RDV and decadron   remains on high flow oxygen with dyspnea
Troponin elevated.  Will trend  No current chest pain.    Likely related to covid infection, resp failure

## 2022-01-04 NOTE — PROGRESS NOTE ADULT - SUBJECTIVE AND OBJECTIVE BOX
Patient: GERMÁN ALBERTO [id number] 89y Female                            Hospitalist Attending Note    Pt remains on High flow O2.  Hypotensive this am.     ____________________PHYSICAL EXAM:  GENERAL:  NAD, lethargic   HEENT: NCAT  CARDIOVASCULAR:  S1, S2  LUNGS: Coarse BS b/l.   ABDOMEN:  soft, (-) tenderness, (-) distension, (+) bowel sounds, (-) guarding, (-) rebound (-) rigidity  EXTREMITIES:  no cyanosis / clubbing.   + edema.   ____________________    VITALS:  Vital Signs Last 24 Hrs  T(C): 36.8 (04 Jan 2022 11:00), Max: 36.8 (04 Jan 2022 11:00)  T(F): 98.2 (04 Jan 2022 11:00), Max: 98.2 (04 Jan 2022 11:00)  HR: 96 (04 Jan 2022 11:00) (80 - 99)  BP: 85/77 (04 Jan 2022 11:00) (75/47 - 103/63)  BP(mean): --  RR: 20 (04 Jan 2022 11:00) (18 - 24)  SpO2: 100% (04 Jan 2022 11:00) (90% - 100%) Daily     Daily   CAPILLARY BLOOD GLUCOSE        I&O's Summary      LABS:                        11.2   21.37 )-----------( 139      ( 04 Jan 2022 07:04 )             35.1     01-04    136  |  102  |  157  ----------------------------<  91  5.1   |  22  |  4.58<H>    Ca    8.2<L>      04 Jan 2022 07:04  Phos  6.7     01-03  Mg     1.9     01-03    TPro  5.8<L>  /  Alb  1.6<L>  /  TBili  1.0  /  DBili  0.5<H>  /  AST  32  /  ALT  14  /  AlkPhos  97  01-04      LIVER FUNCTIONS - ( 04 Jan 2022 07:04 )  Alb: 1.6 g/dL / Pro: 5.8 gm/dL / ALK PHOS: 97 U/L / ALT: 14 U/L / AST: 32 U/L / GGT: x                     MEDICATIONS:  acetaminophen  Suppository .. 650 milliGRAM(s) Rectal every 6 hours PRN  ALBUTerol    90 MICROgram(s) HFA Inhaler 2 Puff(s) Inhalation every 6 hours PRN  bisacodyl Suppository 10 milliGRAM(s) Rectal daily PRN  dexAMETHasone  Injectable 6 milliGRAM(s) IV Push daily  heparin   Injectable 5000 Unit(s) SubCutaneous every 12 hours  HYDROmorphone  Injectable 0.5 milliGRAM(s) IV Push every 6 hours  HYDROmorphone  Injectable 1 milliGRAM(s) IV Push every 2 hours PRN  nystatin Powder 1 Application(s) Topical two times a day  scopolamine 1 mG/72 Hr(s) Patch 1 Patch Transdermal every 72 hours  sodium chloride 0.9%. 1000 milliLiter(s) IV Continuous <Continuous>

## 2022-01-04 NOTE — PROGRESS NOTE ADULT - PROBLEM SELECTOR PLAN 1
CXR- Advanced bilateral infiltrates. Covid pneumonia not excluded.  COVID 19 swabbed in ED - +ve results.  Isolation precautions  Tylenol PRN fever  c/w dexamethasone as patient requiring supplemental oxygen  Holding remdesivier due to significant renal failure  hold lovenox as patient is thrombocytopenic  pulm-  ID-  Prognosis poor -palliative o/b
worsening dyspnea and increasing oxygen requirements  Dilaudid  0.5mg IV scheduled q 6 with 1mg q 2 for breakthrough distress
worsening dyspnea and increasing oxygen requirements  will increase dosing to 0.5mg IV scheduled q 6 with 1mg q 2 for breakthrough distress
CXR- Advanced bilateral infiltrates. Covid pneumonia not excluded.  COVID 19 swabbed in ED - +ve results.  Isolation precautions  Tylenol PRN fever  c/w dexamethasone as patient requiring supplemental oxygen  Holding remdesivier due to significant renal failure  hold lovenox as patient is thrombocytopenic  pulm-  ID-  Prognosis poor - discuss GOC with family as patient currently AAOx2
CXR- Advanced bilateral infiltrates. Covid pneumonia not excluded.  COVID 19 swabbed in ED - +ve results.  Isolation precautions  Tylenol PRN fever  c/w dexamethasone as patient requiring supplemental oxygen  Holding remdesivier due to significant renal failure  heparin sq  ID-  Prognosis poor -palliative o/b, Ethics is on board
CXR- Advanced bilateral infiltrates. Covid pneumonia not excluded.  COVID 19 swabbed in ED - +ve results.  Isolation precautions  Tylenol PRN fever  c/w dexamethasone as patient requiring supplemental oxygen  Holding remdesivier due to significant renal failure  heparin sq  ID-  Prognosis poor -palliative o/b, Ethics is on board
pt c/o of dyspnea   will add low dose dilaudid for dyspnea relief  remains on high flow oxygen   consider dose of lasix if BP allows
CXR- Advanced bilateral infiltrates. Covid pneumonia not excluded.  COVID 19 swabbed in ED - +ve results.  Isolation precautions  Tylenol PRN fever  c/w dexamethasone as patient requiring supplemental oxygen  Holding remdesivier due to significant renal failure  heparin sq  ID-  Prognosis poor -palliative o/b, Ethics is on board

## 2022-01-13 DIAGNOSIS — U07.1 COVID-19: ICD-10-CM

## 2022-01-13 DIAGNOSIS — G93.41 METABOLIC ENCEPHALOPATHY: ICD-10-CM

## 2022-01-13 DIAGNOSIS — A41.89 OTHER SPECIFIED SEPSIS: ICD-10-CM

## 2022-01-13 DIAGNOSIS — J96.00 ACUTE RESPIRATORY FAILURE, UNSPECIFIED WHETHER WITH HYPOXIA OR HYPERCAPNIA: ICD-10-CM

## 2022-01-13 DIAGNOSIS — E87.0 HYPEROSMOLALITY AND HYPERNATREMIA: ICD-10-CM

## 2022-01-13 DIAGNOSIS — E87.5 HYPERKALEMIA: ICD-10-CM

## 2022-01-13 DIAGNOSIS — D69.6 THROMBOCYTOPENIA, UNSPECIFIED: ICD-10-CM

## 2022-01-13 DIAGNOSIS — R53.2 FUNCTIONAL QUADRIPLEGIA: ICD-10-CM

## 2022-01-13 DIAGNOSIS — J12.82 PNEUMONIA DUE TO CORONAVIRUS DISEASE 2019: ICD-10-CM

## 2022-01-13 DIAGNOSIS — N17.9 ACUTE KIDNEY FAILURE, UNSPECIFIED: ICD-10-CM

## 2022-01-13 DIAGNOSIS — I10 ESSENTIAL (PRIMARY) HYPERTENSION: ICD-10-CM

## 2023-03-21 NOTE — PROGRESS NOTE ADULT - PROBLEM SELECTOR PLAN 4
Attempted [facility] monthly outreach,  left message for patient to call back ,can be reached at  [phone]. Will try back at a later time. Medical record reviewed including recent office visits and test results.     Chart review - 3 min  Time with patient - 2 min  Total time - 5 min
although hyperkalemia and hypernatremia have improved with fluids, pt remains with elevated creatinine and worsening overall clinical picture, poor candidate for renal replacement therapy  Prognosis is poor overall w/or w/o RRT
candida, rpt bmp
although hyperkalemia and hypernatremia have improved with fluids, pt remains with elevated creatinine and worsening overall clinical picture, poor candidate for renal replacement therapy  Prognosis is poor overall w/or w/o RRT
candida, rpt bmp
candida, rpt bmp
Cr 5.0, unknown baseline  Will avoid hydration due to severe covid  Renal o/b
unknown baseline creatinine  some level of prerenal azotemia likely   hydrate and monitor.
candida, rpt bmp

## 2023-07-17 NOTE — ED PROVIDER NOTE - CCCP TRG CHIEF CMPLNT
[facility]     Electronically signed by Haily Aj MA on 7/17/2023 at 9:02 AM cough